# Patient Record
Sex: FEMALE | Race: BLACK OR AFRICAN AMERICAN | NOT HISPANIC OR LATINO | Employment: FULL TIME
[De-identification: names, ages, dates, MRNs, and addresses within clinical notes are randomized per-mention and may not be internally consistent; named-entity substitution may affect disease eponyms.]

---

## 2018-07-10 ENCOUNTER — RECORDS - HEALTHEAST (OUTPATIENT)
Dept: ADMINISTRATIVE | Facility: OTHER | Age: 43
End: 2018-07-10

## 2018-10-09 ENCOUNTER — OFFICE VISIT - HEALTHEAST (OUTPATIENT)
Dept: FAMILY MEDICINE | Facility: CLINIC | Age: 43
End: 2018-10-09

## 2018-10-09 ENCOUNTER — COMMUNICATION - HEALTHEAST (OUTPATIENT)
Dept: TELEHEALTH | Facility: CLINIC | Age: 43
End: 2018-10-09

## 2018-10-09 DIAGNOSIS — Z00.00 PREVENTATIVE HEALTH CARE: ICD-10-CM

## 2018-10-09 DIAGNOSIS — H93.13 TINNITUS, BILATERAL: ICD-10-CM

## 2018-10-09 DIAGNOSIS — Z23 IMMUNIZATION DUE: ICD-10-CM

## 2018-10-09 DIAGNOSIS — R23.8 PIMPLES: ICD-10-CM

## 2018-10-09 DIAGNOSIS — R93.89 ABNORMAL CT SCAN, CHEST: ICD-10-CM

## 2018-10-09 DIAGNOSIS — Z30.09 ENCOUNTER FOR COUNSELING REGARDING CONTRACEPTION: ICD-10-CM

## 2018-10-09 DIAGNOSIS — D25.9 UTERINE LEIOMYOMA, UNSPECIFIED LOCATION: ICD-10-CM

## 2018-10-09 ASSESSMENT — MIFFLIN-ST. JEOR: SCORE: 1505.24

## 2018-10-09 NOTE — ASSESSMENT & PLAN NOTE
Obtain release of information for images from 7/10/2018 ct pe run - in anticipation of radiologist needing this to compare the mass for stability.     Order non contrast chest ct today for follow up.    Follow up based on ct results. If stable, no need for further follow up.

## 2018-10-09 NOTE — ASSESSMENT & PLAN NOTE
Used depo in the past. Offered again today, but she says that she does not want to do that due to breast feeding. She forgets the pill, she uses condoms for now.

## 2018-10-19 ENCOUNTER — HOSPITAL ENCOUNTER (OUTPATIENT)
Dept: CT IMAGING | Facility: CLINIC | Age: 43
Discharge: HOME | End: 2018-10-19
Attending: FAMILY MEDICINE

## 2018-10-19 DIAGNOSIS — R93.89 ABNORMAL CT SCAN, CHEST: ICD-10-CM

## 2018-10-22 ENCOUNTER — COMMUNICATION - HEALTHEAST (OUTPATIENT)
Dept: FAMILY MEDICINE | Facility: CLINIC | Age: 43
End: 2018-10-22

## 2018-10-25 ENCOUNTER — RECORDS - HEALTHEAST (OUTPATIENT)
Dept: ADMINISTRATIVE | Facility: OTHER | Age: 43
End: 2018-10-25

## 2018-10-25 LAB — PAP SMEAR - HIM PATIENT REPORTED: NORMAL

## 2018-12-04 ENCOUNTER — OFFICE VISIT - HEALTHEAST (OUTPATIENT)
Dept: AUDIOLOGY | Facility: CLINIC | Age: 43
End: 2018-12-04

## 2018-12-04 ENCOUNTER — OFFICE VISIT - HEALTHEAST (OUTPATIENT)
Dept: OTOLARYNGOLOGY | Facility: CLINIC | Age: 43
End: 2018-12-04

## 2018-12-04 DIAGNOSIS — H93.13 TINNITUS, BILATERAL: ICD-10-CM

## 2018-12-04 DIAGNOSIS — R42 DIZZINESS AND GIDDINESS: ICD-10-CM

## 2018-12-04 DIAGNOSIS — Z01.10 EXAMINATION OF EARS AND HEARING: ICD-10-CM

## 2019-01-02 ENCOUNTER — COMMUNICATION - HEALTHEAST (OUTPATIENT)
Dept: FAMILY MEDICINE | Facility: CLINIC | Age: 44
End: 2019-01-02

## 2019-04-02 ENCOUNTER — OFFICE VISIT - HEALTHEAST (OUTPATIENT)
Dept: FAMILY MEDICINE | Facility: CLINIC | Age: 44
End: 2019-04-02

## 2019-04-02 DIAGNOSIS — R20.0 LEFT ARM NUMBNESS: ICD-10-CM

## 2019-04-02 DIAGNOSIS — Z00.00 PREVENTATIVE HEALTH CARE: ICD-10-CM

## 2019-04-02 DIAGNOSIS — R51.9 LEFT-SIDED HEADACHE: ICD-10-CM

## 2019-04-02 DIAGNOSIS — H93.13 TINNITUS, BILATERAL: ICD-10-CM

## 2019-04-02 ASSESSMENT — MIFFLIN-ST. JEOR: SCORE: 1509.77

## 2019-04-02 NOTE — ASSESSMENT & PLAN NOTE
Intermittent left-sided headache that has come and gone over the last month about 5 times.  She decided to come in because at times the pain is so severe that she would rated 10 out of 10.  Tylenol will decrease the pain to the point where she can tolerate it but it does not go away.  The pain will last for about a half a day at a time.  Once she did note 15 seconds of numbness going into her left arm that seemed to be related to the severe pain.  It spontaneously resolved so she did not seek medical attention-that happened last week.  Today she wanted to come in and be seen.  She is currently not having the headache.she is currently not having the headache.     Because of pain out of proportion to exam, and because she had numbness in her arm (although it was short lived and transient), and she has pain adjacent to the left c spine at c3 today I ordered both mri cspine and neurology consult.     Follow up in one month or sooner if needed.

## 2019-04-24 ENCOUNTER — HOSPITAL ENCOUNTER (OUTPATIENT)
Dept: MRI IMAGING | Facility: CLINIC | Age: 44
Discharge: HOME | End: 2019-04-24
Attending: FAMILY MEDICINE

## 2019-04-24 DIAGNOSIS — R20.0 LEFT ARM NUMBNESS: ICD-10-CM

## 2019-04-24 DIAGNOSIS — R51.9 LEFT-SIDED HEADACHE: ICD-10-CM

## 2019-05-07 ENCOUNTER — RECORDS - HEALTHEAST (OUTPATIENT)
Dept: ADMINISTRATIVE | Facility: OTHER | Age: 44
End: 2019-05-07

## 2019-05-13 ENCOUNTER — RECORDS - HEALTHEAST (OUTPATIENT)
Dept: LAB | Facility: CLINIC | Age: 44
End: 2019-05-13

## 2019-05-13 LAB
C REACTIVE PROTEIN LHE: 0.1 MG/DL (ref 0–0.8)
ERYTHROCYTE [SEDIMENTATION RATE] IN BLOOD BY WESTERGREN METHOD: 2 MM/HR (ref 0–20)

## 2019-05-14 LAB
CARDIOLIPIN IGG SER IA-ACNC: <1.6 GPL-U/ML (ref 0–19.9)
CARDIOLIPIN IGM SER IA-ACNC: 1.9 MPL-U/ML (ref 0–19.9)

## 2019-05-18 ENCOUNTER — HOSPITAL ENCOUNTER (OUTPATIENT)
Dept: MRI IMAGING | Facility: CLINIC | Age: 44
Discharge: HOME | End: 2019-05-18
Attending: PSYCHIATRY & NEUROLOGY

## 2019-05-18 DIAGNOSIS — G44.53 THUNDERCLAP HEADACHE: ICD-10-CM

## 2019-11-08 ENCOUNTER — RECORDS - HEALTHEAST (OUTPATIENT)
Dept: HEALTH INFORMATION MANAGEMENT | Facility: CLINIC | Age: 44
End: 2019-11-08

## 2020-11-10 ENCOUNTER — OFFICE VISIT - HEALTHEAST (OUTPATIENT)
Dept: FAMILY MEDICINE | Facility: CLINIC | Age: 45
End: 2020-11-10

## 2020-11-10 DIAGNOSIS — Z11.4 SCREENING FOR HIV (HUMAN IMMUNODEFICIENCY VIRUS): ICD-10-CM

## 2020-11-10 DIAGNOSIS — Z12.31 ENCOUNTER FOR SCREENING MAMMOGRAM FOR MALIGNANT NEOPLASM OF BREAST: ICD-10-CM

## 2020-11-10 DIAGNOSIS — Z12.4 SCREENING FOR MALIGNANT NEOPLASM OF CERVIX: ICD-10-CM

## 2020-11-10 DIAGNOSIS — R42 DIZZINESS: ICD-10-CM

## 2020-11-10 DIAGNOSIS — Z13.220 SCREENING, LIPID: ICD-10-CM

## 2020-11-10 DIAGNOSIS — Z11.59 ENCOUNTER FOR HEPATITIS C SCREENING TEST FOR LOW RISK PATIENT: ICD-10-CM

## 2020-11-10 DIAGNOSIS — E66.811 CLASS 1 OBESITY DUE TO EXCESS CALORIES WITHOUT SERIOUS COMORBIDITY WITH BODY MASS INDEX (BMI) OF 32.0 TO 32.9 IN ADULT: ICD-10-CM

## 2020-11-10 DIAGNOSIS — Z30.09 ENCOUNTER FOR COUNSELING REGARDING CONTRACEPTION: ICD-10-CM

## 2020-11-10 DIAGNOSIS — Z13.228 SCREENING FOR METABOLIC DISORDER: ICD-10-CM

## 2020-11-10 DIAGNOSIS — R93.89 ABNORMAL CT SCAN, CHEST: ICD-10-CM

## 2020-11-10 DIAGNOSIS — Z00.00 PREVENTATIVE HEALTH CARE: ICD-10-CM

## 2020-11-10 DIAGNOSIS — Z13.0 SCREENING, ANEMIA, DEFICIENCY, IRON: ICD-10-CM

## 2020-11-10 DIAGNOSIS — R68.84 JAW PAIN: ICD-10-CM

## 2020-11-10 DIAGNOSIS — E66.09 CLASS 1 OBESITY DUE TO EXCESS CALORIES WITHOUT SERIOUS COMORBIDITY WITH BODY MASS INDEX (BMI) OF 32.0 TO 32.9 IN ADULT: ICD-10-CM

## 2020-11-10 LAB
ALBUMIN SERPL-MCNC: 4.1 G/DL (ref 3.5–5)
ALP SERPL-CCNC: 52 U/L (ref 45–120)
ALT SERPL W P-5'-P-CCNC: 56 U/L (ref 0–45)
ANION GAP SERPL CALCULATED.3IONS-SCNC: 10 MMOL/L (ref 5–18)
AST SERPL W P-5'-P-CCNC: 28 U/L (ref 0–40)
BASOPHILS # BLD AUTO: 0.1 THOU/UL (ref 0–0.2)
BASOPHILS NFR BLD AUTO: 1 % (ref 0–2)
BILIRUB SERPL-MCNC: 0.3 MG/DL (ref 0–1)
BUN SERPL-MCNC: 10 MG/DL (ref 8–22)
CALCIUM SERPL-MCNC: 10 MG/DL (ref 8.5–10.5)
CHLORIDE BLD-SCNC: 106 MMOL/L (ref 98–107)
CO2 SERPL-SCNC: 23 MMOL/L (ref 22–31)
CREAT SERPL-MCNC: 0.68 MG/DL (ref 0.6–1.1)
EOSINOPHIL # BLD AUTO: 0.1 THOU/UL (ref 0–0.4)
EOSINOPHIL NFR BLD AUTO: 1 % (ref 0–6)
ERYTHROCYTE [DISTWIDTH] IN BLOOD BY AUTOMATED COUNT: 11 % (ref 11–14.5)
GFR SERPL CREATININE-BSD FRML MDRD: >60 ML/MIN/1.73M2
GLUCOSE BLD-MCNC: 80 MG/DL (ref 70–125)
HCT VFR BLD AUTO: 45.4 % (ref 35–47)
HGB BLD-MCNC: 15.5 G/DL (ref 12–16)
HIV 1+2 AB+HIV1 P24 AG SERPL QL IA: NEGATIVE
LYMPHOCYTES # BLD AUTO: 2.5 THOU/UL (ref 0.8–4.4)
LYMPHOCYTES NFR BLD AUTO: 31 % (ref 20–40)
MCH RBC QN AUTO: 31.8 PG (ref 27–34)
MCHC RBC AUTO-ENTMCNC: 34.2 G/DL (ref 32–36)
MCV RBC AUTO: 93 FL (ref 80–100)
MONOCYTES # BLD AUTO: 0.7 THOU/UL (ref 0–0.9)
MONOCYTES NFR BLD AUTO: 8 % (ref 2–10)
NEUTROPHILS # BLD AUTO: 4.7 THOU/UL (ref 2–7.7)
NEUTROPHILS NFR BLD AUTO: 59 % (ref 50–70)
PLATELET # BLD AUTO: 333 THOU/UL (ref 140–440)
PMV BLD AUTO: 7.9 FL (ref 7–10)
POTASSIUM BLD-SCNC: 3.8 MMOL/L (ref 3.5–5)
PROT SERPL-MCNC: 7.3 G/DL (ref 6–8)
RBC # BLD AUTO: 4.88 MILL/UL (ref 3.8–5.4)
SODIUM SERPL-SCNC: 139 MMOL/L (ref 136–145)
TSH SERPL DL<=0.005 MIU/L-ACNC: 1.49 UIU/ML (ref 0.3–5)
WBC: 7.9 THOU/UL (ref 4–11)

## 2020-11-10 ASSESSMENT — MIFFLIN-ST. JEOR: SCORE: 1595.96

## 2020-11-10 NOTE — ASSESSMENT & PLAN NOTE
Follow up ct was normal. It does not appear a comparison was ever made, will try and contact radiology to confirm if they were able tocompare. Message attached to this visit sent to staff to research.

## 2020-11-10 NOTE — ASSESSMENT & PLAN NOTE
Flu shot declined but plans to do. Got depo provera shot today and arm sore.   Pap: reviewed chart, pap unsatisfactory for eval 10/2018, will repeat today.   Mammo: recommended.   Colonoscopy:  There is no family or personal history, not indicated    Std testing desired: declined offered  Osteoporosis prevention discussed.  vitamin d levels ordered. Recommend daily calcium and vitamin d intake to keep good bone health. Recommend weight bearing exercise, no tobacco, and limit alcohol  dexa - no indication (age)  Recommend sunscreen, exercise, & healthy diet.  Offered cbc, cmp,lipids and asked what other testing she  desires today  I have had an Advance Directives discussion with the patient.   Body mass index is 32.12 kg/m .   mychart offered.

## 2020-11-10 NOTE — ASSESSMENT & PLAN NOTE
Weight reduction recommended.  She  is offered participating in the Weight Loss Program at [facility].

## 2020-11-10 NOTE — ASSESSMENT & PLAN NOTE
She says she has left lower jaw pain from which her headache stems, she did see neurologist and all work up was negative, I have recommended she see dentist.     5/7/19 neuro note reviewed (nasp)   MRI C-spine noted 4/24/2019    Today complains of dizziness, weakness, headache that seems related to left jaw pain which hurts when she touches her left lower jaw and pins and needles sensation.     Labs today and plan to see dentist and follow up in 2 weeks to review all this and plan.

## 2020-11-11 LAB — HCV AB SERPL QL IA: NEGATIVE

## 2020-11-17 ENCOUNTER — AMBULATORY - HEALTHEAST (OUTPATIENT)
Dept: LAB | Facility: CLINIC | Age: 45
End: 2020-11-17

## 2020-11-17 ENCOUNTER — COMMUNICATION - HEALTHEAST (OUTPATIENT)
Dept: FAMILY MEDICINE | Facility: CLINIC | Age: 45
End: 2020-11-17

## 2020-11-17 DIAGNOSIS — Z13.220 SCREENING, LIPID: ICD-10-CM

## 2020-11-17 LAB
CHOLEST SERPL-MCNC: 163 MG/DL
FASTING STATUS PATIENT QL REPORTED: YES
HDLC SERPL-MCNC: 38 MG/DL
LDLC SERPL CALC-MCNC: 110 MG/DL
TRIGL SERPL-MCNC: 76 MG/DL

## 2020-12-04 ENCOUNTER — HOSPITAL ENCOUNTER (OUTPATIENT)
Dept: MAMMOGRAPHY | Facility: CLINIC | Age: 45
Discharge: HOME | End: 2020-12-04
Attending: FAMILY MEDICINE

## 2020-12-04 DIAGNOSIS — Z12.31 ENCOUNTER FOR SCREENING MAMMOGRAM FOR MALIGNANT NEOPLASM OF BREAST: ICD-10-CM

## 2021-05-27 NOTE — PROGRESS NOTES
ASSESSMENT AND PLAN:     Problem List Items Addressed This Visit        Unprioritized    Preventative health care     Due for annual exam.          Left-sided headache     Intermittent left-sided headache that has come and gone over the last month about 5 times.  She decided to come in because at times the pain is so severe that she would rated 10 out of 10.  Tylenol will decrease the pain to the point where she can tolerate it but it does not go away.  The pain will last for about a half a day at a time.  Once she did note 15 seconds of numbness going into her left arm that seemed to be related to the severe pain.  It spontaneously resolved so she did not seek medical attention-that happened last week.  Today she wanted to come in and be seen.  She is currently not having the headache.she is currently not having the headache.     Because of pain out of proportion to exam, and because she had numbness in her arm (although it was short lived and transient), and she has pain adjacent to the left c spine at c3 today I ordered both mri cspine and neurology consult.     Follow up in one month or sooner if needed.          Relevant Orders    Ambulatory referral to Neurology    MR Cervical Spine With Without Contrast    RESOLVED: Tinnitus, bilateral     Resolved per patient.            Other Visit Diagnoses     Left arm numbness    -  Primary    Relevant Orders    Ambulatory referral to Neurology    MR Cervical Spine With Without Contrast           Chief Complaint   Patient presents with     Headache     Patient has been dealing with a left-sided headache for the last 2 months. States it comes and goes, but gets very severe at times.         JONATHAN Gavin is a 43 y.o. female was new to me 10/2018 when she had moved from [place].  At that time we dealt with a uterine fibroid, and axillary and some genital lesions.  Today, she comes in complaining of new problem of headache    Headache, left side, sore in her  scalp and inside of head also hurts.  It hurts to lay on her left side when she is in bed.  Also hurts to move her head.  She points to the left neck and back area.  It seems to involve her arm on occasion - described one episode lasting 15 min of numbness.      No problem with vision, she just got new glasses    She is right hand dominant.  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 She has had this headache 5 times, it seems to come on in the evenings, and 700 mg of tylenol seems to help so she can sleep, but when she wakes up it is still there.  She notes when she lays on the left side her scalp can even hurt (back of left head).  It is pretty light headache the next morning. She says the pain is 10/ 10 at its worst and she notes it has only been that bad twice and it lasted at that intensity for two hours, tylenol helped it get better but it was not gone.     She also reminds me that she did have a problem with tinnitus, but that is gone since November 2018.     pain out of proportion to exam, once left arm went numb for 15 seconds - not problematic now.        ct of axilla lump looked normal, but we were awaiting outside ct to compare. did that ever happen?  She says that she can track this down.         Social History     Tobacco Use   Smoking Status Never Smoker   Smokeless Tobacco Never Used      No current outpatient medications on file prior to visit.     No current facility-administered medications on file prior to visit.       No Known Allergies    Review of Systems   Constitutional: Negative.    HENT: Negative.    Eyes: Negative.    Respiratory: Negative.    Cardiovascular: Negative.    Gastrointestinal: Negative.    Endocrine: Negative.    Genitourinary: Negative.    Musculoskeletal: Negative.    Skin: Negative.    Neurological: Negative.    Hematological: Negative.   "  Psychiatric/Behavioral: Negative.         OBJECTIVE: /66   Pulse 68   Resp 12   Ht 5' 6.5\" (1.689 m)   Wt 183 lb (83 kg)   LMP 03/03/2019 (Approximate)   Breastfeeding? No   BMI 29.09 kg/m     Physical Exam   Constitutional: She is oriented to person, place, and time. She appears well-developed and well-nourished. No distress.   HENT:   Head: Normocephalic and atraumatic.   Right Ear: External ear normal.   Left Ear: External ear normal.   Nose: Nose normal.   Mouth/Throat: Oropharynx is clear and moist.   Of note she is wearing a wig and she does have a little scalp tenderness just above the left ear where the wig touches the scalp, but she says the wig is new and could not be causing this problem.   Eyes: Conjunctivae are normal.   Neck: Normal range of motion. Neck supple.   Some neck pain that may stem from he cspine with palpation of the tissue adjacent to the c spine at about the c3 level.  Otherwise she is not currently having the headache.    Cardiovascular: Normal rate, regular rhythm and normal heart sounds.   Pulmonary/Chest: Effort normal and breath sounds normal.   Musculoskeletal: Normal range of motion.   Neurological: She is alert and oriented to person, place, and time.   Skin: Skin is warm and dry.   Psychiatric: She has a normal mood and affect.        This note was created using Dragon dictation.  Please excuse any grammatical errors.   "

## 2021-06-02 VITALS — WEIGHT: 183 LBS | BODY MASS INDEX: 28.72 KG/M2 | HEIGHT: 67 IN

## 2021-06-02 VITALS — WEIGHT: 182 LBS | HEIGHT: 67 IN | BODY MASS INDEX: 28.56 KG/M2

## 2021-06-05 VITALS
HEART RATE: 58 BPM | OXYGEN SATURATION: 97 % | DIASTOLIC BLOOD PRESSURE: 62 MMHG | WEIGHT: 202 LBS | HEIGHT: 67 IN | SYSTOLIC BLOOD PRESSURE: 100 MMHG | BODY MASS INDEX: 31.71 KG/M2

## 2021-06-13 NOTE — TELEPHONE ENCOUNTER
----- Message from Darcy Yoon MD sent at 11/10/2020  3:28 PM CST -----  Ct scan done in past was abnormal. Radiology was waiting on old scan to compare, were they ever able to do this?

## 2021-06-13 NOTE — TELEPHONE ENCOUNTER
Still awaiting ct of chest from [place] I have left a message with patient to call and get ct scan sent up to us so we can do a comparison

## 2021-06-13 NOTE — PROGRESS NOTES
FEMALE PREVENTATIVE EXAM  Jaw pain addressed above and beyond   Today complains of dizziness, weakness, headache that seems related to left jaw pain which hurts when she touches her left lower jaw and pins and needles sensation.     Assessment and Plan:   Patient has been advised of split billing requirements and indicates understanding: Yes    Problem List Items Addressed This Visit        Unprioritized    Abnormal CT scan, chest     Follow up ct was normal. It does not appear a comparison was ever made, will try and contact radiology to confirm if they were able to compare. Message attached to this visit sent to staff to research.          Encounter for counseling regarding contraception     Currently using depo provera.          Preventative health care     Flu shot declined but plans to do. Got depo provera shot today and arm sore.   Pap: reviewed chart, pap unsatisfactory for eval 10/2018, will repeat today.   Mammo: recommended.   Colonoscopy:  There is no family or personal history, not indicated    Std testing desired: declined offered  Osteoporosis prevention discussed.  vitamin d levels ordered. Recommend daily calcium and vitamin d intake to keep good bone health. Recommend weight bearing exercise, no tobacco, and limit alcohol  dexa - no indication (age)  Recommend sunscreen, exercise, & healthy diet.  Offered cbc, cmp, lipids and asked what other testing she  desires today  I have had an Advance Directives discussion with the patient.   Body mass index is 32.12 kg/m .   mychart offered.           Jaw pain     She says she has left lower jaw pain from which her headache stems, she did see neurologist and all work up was negative, I have recommended she see dentist.     5/7/19 neuro note reviewed (nasp)   MRI C-spine noted 4/24/2019    Today complains of dizziness, weakness, headache that seems related to left jaw pain which hurts when she touches her left lower jaw and pins and needles sensation.      Labs today and plan to see dentist and follow up in 2 weeks to review all this and plan.          Relevant Orders    Ambulatory referral to Dentistry    Class 1 obesity without serious comorbidity with body mass index (BMI) of 32.0 to 32.9 in adult     Weight reduction recommended.  She  is offered participating in the Weight Loss Program at [facility].            Other Visit Diagnoses     Screening for metabolic disorder    -  Primary    Relevant Orders    Comprehensive Metabolic Panel    Screening, anemia, deficiency, iron        Relevant Orders    HM1(CBC and Differential)    Screening, lipid        Relevant Orders    Lipid [place] FASTING    Encounter for hepatitis C screening test for low risk patient        Relevant Orders    Hepatitis C Antibody (Anti-HCV)    Screening for HIV (human immunodeficiency virus)        Relevant Orders    HIV Antigen/Antibody Screening [place]    Screening for malignant neoplasm of cervix        Relevant Orders    Gynecologic Cytology (PAP Smear)    Dizziness        Relevant Orders    Comprehensive Metabolic Panel    Thyroid [facility]    Encounter for screening mammogram for malignant neoplasm of breast        Relevant Orders    Mammo Screening Bilateral           Next follow up:  Return in about 2 weeks (around 11/24/2020) for Next scheduled follow up.    Immunization Review  Adult Imm Review: Missing doses of flu shot, wants to get at follow up because she got depo provera today and arm sore.  BMI: 32  Social History     Tobacco Use   Smoking Status Never Smoker   Smokeless Tobacco Never Used      I discussed the following with the patient:   Adult Healthy Living: Importance of regular exercise  Healthy nutrition  Weight loss referral options    I have had an Advance Directives discussion with the patient.    Subjective:   Chief Complaint: Lindsey Gavin is an 45 y.o. female here for a preventative health visit.  Complains of dizziness, weakness, headache that  "seems related to left jaw pain which hurts when she touches her left lower jaw and pins and needles sensation.     Patient has been advised of split billing requirements and indicates understanding: Yes  HPI:  Comes in today for annual exam and complains of left side lower jaw pain and headache on that side. She has not seen a dentist.     Healthy Habits  Are you taking a daily aspirin? No  Do you typically exercising at least 40 min, 3-4 times per week?  NO  Do you usually eat at least 4 servings of fruit and vegetables a day, include whole grains and fiber and avoid regularly eating high fat foods? Yes  Have you had an eye exam in the past two years? Yes  Do you see a dentist twice per year? NO  Do you have any concerns regarding sleep? No    Safety Screen  If you own firearms, are they secured in a locked gun cabinet or with trigger locks? NO  Do you feel you are safe where you are living?: Yes (11/10/2020  2:46 PM)  Do you feel you are safe in your relationship(s)?: Yes (11/10/2020  2:46 PM)    Review of Systems:  Please see above.  The rest of the review of systems are negative for all systems.     Pap History:   Yes - updated in Problem List and Health Maintenance accordingly  Cancer Screening       Status Date      PAP SMEAR Next Due 10/25/2021      Done 10/25/2018 PAP SMEAR EXTERNAL RESULT     Patient has more history with this topic...          Patient Care Team:  Darcy Yoon MD as PCP - General (Family Medicine)  Darcy Yoon MD as Assigned PCP        History     Reviewed By Date/Time Sections Reviewed    Darcy Yoon MD 11/10/2020  3:28 PM Social Documentation    Darcy Yoon MD 11/10/2020  3:27 PM Medical, Surgical, Family    Staci Henson CMA 11/10/2020  2:46 PM Tobacco            Objective:   Vital Signs:   Visit Vitals  /62 (Patient Site: Left Arm, Patient Position: Sitting, Cuff Size: Adult Large)   Pulse (!) 58   Ht 5' 6.5\" (1.689 m)   Wt 202 lb (91.6 kg)   LMP " 10/05/2020 (Approximate)   SpO2 97%   BMI 32.12 kg/m           PHYSICAL EXAM  Physical Exam  Constitutional:       General: She is not in acute distress.     Appearance: She is well-developed.   HENT:      Head: Normocephalic and atraumatic.   Eyes:      Conjunctiva/sclera: Conjunctivae normal.   Neck:      Musculoskeletal: Neck supple.   Cardiovascular:      Rate and Rhythm: Normal rate and regular rhythm.   Pulmonary:      Effort: Pulmonary effort is normal.   Chest:      Breasts: Breasts are symmetrical.         Right: Normal. No swelling, bleeding, inverted nipple, mass, nipple discharge, skin change or tenderness.         Left: Normal. No swelling, bleeding, inverted nipple, mass, nipple discharge, skin change or tenderness.   Genitourinary:     Exam position: Lithotomy position.      Vagina: Normal.      Cervix: Normal.      Uterus: Normal.       Adnexa: Right adnexa normal and left adnexa normal.      Rectum: Normal.   Musculoskeletal: Normal range of motion.   Skin:     General: Skin is warm and dry.   Neurological:      Mental Status: She is alert and oriented to person, place, and time. Mental status is at baseline.      Cranial Nerves: Cranial nerves are intact.      Sensory: Sensation is intact.      Motor: Motor function is intact.      Coordination: Coordination is intact.      Comments: Normal gait, able to mount exam table. Normal eye contact, conjugate gaze.           The ASCVD Risk score (Oliverio JC Jr., et al., 2013) failed to calculate for the following reasons:    Cannot find a previous HDL lab    Cannot find a previous total cholesterol lab         Medication List      as of November 10, 2020  3:50 PM     You have not been prescribed any medications.         Additional Screenings Completed Today:

## 2021-06-16 PROBLEM — D25.9 UTERINE FIBROID: Status: ACTIVE | Noted: 2018-10-09

## 2021-06-16 PROBLEM — Z30.09 ENCOUNTER FOR COUNSELING REGARDING CONTRACEPTION: Status: ACTIVE | Noted: 2018-10-09

## 2021-06-16 PROBLEM — R68.84 JAW PAIN: Status: ACTIVE | Noted: 2019-04-02

## 2021-06-16 PROBLEM — E66.811 CLASS 1 OBESITY WITHOUT SERIOUS COMORBIDITY WITH BODY MASS INDEX (BMI) OF 32.0 TO 32.9 IN ADULT: Status: ACTIVE | Noted: 2020-11-10

## 2021-06-16 PROBLEM — R93.89 ABNORMAL CT SCAN, CHEST: Status: ACTIVE | Noted: 2018-10-09

## 2021-06-16 PROBLEM — R23.8 PIMPLES: Status: ACTIVE | Noted: 2018-10-09

## 2021-06-16 PROBLEM — Z00.00 PREVENTATIVE HEALTH CARE: Status: ACTIVE | Noted: 2018-10-09

## 2021-06-20 NOTE — PROGRESS NOTES
ASSESSMENT AND PLAN:     Problem List Items Addressed This Visit        Unprioritized    Abnormal CT scan, chest     Obtain release of information for images from 7/10/2018 ct pe run - in anticipation of radiologist needing this to compare the mass for stability.     Order non contrast chest ct today for follow up.    Follow up based on ct results. If stable, no need for further follow up.          Relevant Orders    CT Chest Without Contrast    Uterine fibroid     Gynecologist consult.          Relevant Orders    Ambulatory referral to Gynecology    Encounter for counseling regarding contraception     Used depo in the past. Offered again today, but she says that she does not want to do that due to breast feeding. She forgets the pill, she uses condoms for now.          Pimples     Noted in groin, warm pack or warm tub soaks recommended. Watch and wait return if not resolving.          Tinnitus, bilateral     Noted since July about twice a month for 15 min at a time and self resolves. Recommend audiology consult. Follow up prn.          Relevant Orders    Ambulatory referral to Audiology    Preventative health care     Recommend annual exam.            Other Visit Diagnoses     Immunization due    -  Primary    Relevant Orders    Influenza, Seasonal,Quad Inj, 36+ MOS           Chief Complaint   Patient presents with     [facility] [place] Care     Patient states she needs a repeat CT scan. Brought old records with her.      Hearing Problem     States that at times, she can't hear due to noises in her ears. Lasts about 10 minutes at a time.         JONATHAN  Lindsey Gavin is a 43 y.o. female is new to me today and is new to [place].  She recently moved here from [place].  She has an abnormality on her CT scan that needs follow-up.  She also has noticed some ringing in her ears on and off for the past few months.  She has a current comes for about 15 minutes at a time and goes away on its own but she is noticed it in  both ears and she is wondering what that could be.  She also wants to consult with a gynecologist because she has a uterine fibroid.  In the past she has used Depo-Provera for contraception but is not sure she wants to use that again.  She wants to discuss with the gynecologist what is the best especially given her fibroid.  She would like a referral to gynecology.  She also needs a postpartum check because she had a baby in June and never really had a postpartum check.  She has some bumps in her groin area which she would like me to take a look at today as well.      History   Smoking Status     Never Smoker   Smokeless Tobacco     Never Used      No current outpatient prescriptions on file prior to visit.     No current facility-administered medications on file prior to visit.       No Known Allergies   Family History   Problem Relation Age of Onset     Stroke Mother      Hypertension Mother      Other Father      influenza like illness     No Medical Problems Sister      No Medical Problems Brother      No Medical Problems Sister      No Medical Problems Sister      No Medical Problems Sister      No Medical Problems Sister      No Medical Problems Sister      No Medical Problems Brother      Breast cancer Neg Hx      Skin cancer Neg Hx      Colon cancer Neg Hx       Social History     Social History     Marital status:      Spouse name: N/A     Number of children: N/A     Years of education: N/A     Occupational History     lpn      Social History Main Topics     Smoking status: Never Smoker     Smokeless tobacco: Never Used     Alcohol use No     Drug use: No     Sexual activity: Yes     Partners: Male     Birth control/ protection: Condom     Other Topics Concern     None     Social History Narrative    10/9/2018 - moved to [place] from [place]      Review of Systems   Constitutional: Negative.    HENT: Negative.    Eyes: Negative.    Respiratory: Negative.    Cardiovascular: Negative.    Gastrointestinal:  "Negative.    Endocrine: Negative.    Genitourinary: Negative.    Musculoskeletal: Negative.    Skin: Negative.    Neurological: Negative.    Hematological: Negative.    Psychiatric/Behavioral: Negative.         OBJECTIVE: /70  Pulse 60  Resp 12  Ht 5' 6.5\" (1.689 m)  Wt 182 lb (82.6 kg)  LMP 09/20/2018 (Exact Date)  Breastfeeding? Yes  BMI 28.94 kg/m2   Physical Exam   Constitutional: She is oriented to person, place, and time. She appears well-developed and well-nourished. No distress.   HENT:   Head: Normocephalic and atraumatic.   Right Ear: External ear normal.   Left Ear: External ear normal.   Eyes: Conjunctivae are normal.   Neck: Neck supple.   Cardiovascular: Normal rate and regular rhythm.    Pulmonary/Chest: Effort normal.   Abdominal: Soft.   Musculoskeletal: Normal range of motion.   Neurological: She is alert and oriented to person, place, and time.   Skin: Skin is warm and dry.   There are 2 small bumps noted in the skin of the proximal left inner thigh.  Both appear to be pimples.  Neither appear to be malignant.   Psychiatric: She has a normal mood and affect.     Orders Placed This Encounter   Procedures     CT Chest Without Contrast     Influenza, Seasonal,Quad Inj, 36+ MOS     Ambulatory referral to Gynecology     Ambulatory referral to Audiology        This note was created using Dragon dictation.  Please excuse any grammatical errors.    "

## 2021-06-22 NOTE — TELEPHONE ENCOUNTER
Please call patient and let her know that I have been waiting [place] CT [place] compared to her CT scan done 2 months ago.  I still have not received the comparison CT scan.  If she can obtain this we are happy to look at it -but for now we do not have it.

## 2021-06-22 NOTE — TELEPHONE ENCOUNTER
There is an External CT Scan dated 7/10/18 in the MEDIA TAB for this pt. ... is this the one you are looking for?

## 2021-06-22 NOTE — PROGRESS NOTES
Lindsey Gavin is a 43 y.o. female seen in consultation at the request of Dr. Yoon for tinnitus.  Patient has no noticed hearing loss .  Denies otologic history of infections or surgeries. Denies vertigo.  She notes her hearing was initially quite loud and frequent and has slowly improved in frequency.  She last heard the tinnitus about a month ago.      ALLERGY:  No Known Allergies    MEDICATIONS:     No current outpatient medications on file prior to visit.     No current facility-administered medications on file prior to visit.        Past Medical/Surgical History, Family History and Social History reviewed in detail and documented separately in the medical record.    Complete Review of Systems:  A 10-point review was performed.  Pertinent positives are noted in the HPI and on a separate scanned document in the chart.    EXAM:  There were no vitals filed for this visit.    Nurse documentation reviewed  and documented separately.    General Appearance: Pleasant, alert, appropriate appearance for age. No acute distress    Head Exam: Normal. Normocephalic, atraumatic.    Eye Exam: Normal external eye, conjunctiva, lids, cornea. Extra-ocular movements are intact.    Left external ear: normal  Left otoscopic exam: Normal EAC. Normal TM     Right external ear: normal  Right otoscopic exam: Normal EAC. Normal TM    Nose Exam: Normal external nose. Septum midline. Nasal mucosa normal.  Inferior turbinates normal.    OroPharynx Exam: Dental hygiene adequate. Normal tongue. Normal buccal mucosa. Normal palate.  Normal pharynx. Normal tonsils.    Neck Exam: Supple, no masses or nodes. Trachea and larynx midline.    Thyroid Exam: No tenderness, nodules or enlargement.    Salivary Glands: nontender without masses    Neuro: Alert and oriented times 3, CN 2-12 grossly intact, no nystagmus, PERRL, EOMI, normal speech and gait    Chest/Respiratory Exam: Normal chest wall motion and respiratory effort. No audible  stridor or wheezing.    Cardiovascular Exam: Regular rate and rhythm.  No cyanosis, clubbing or edema.    Pulses: carotid pulses normal    ASSESSMENT:  1. Tinnitus, bilateral        PLAN: Findings, assessment, and management options were discussed.  Discussed pathophysiology, masking techniques and triggers for tinnitus.  We discussed current guidelines in regards to approach to tinnitus.  Fortunately she has been without the sound for a few weeks.

## 2021-06-22 NOTE — TELEPHONE ENCOUNTER
Radiologist reports original images to compare.  So I was looking for images-not a report.  Could you please let the patient know if we do not have this so she can request them again?

## 2021-06-22 NOTE — TELEPHONE ENCOUNTER
Left message to call back for: pt  Information to relay to patient:  See below message from Dr. Yoon and relay

## 2021-06-22 NOTE — TELEPHONE ENCOUNTER
I will close this encounter for now and this message can still be relayed when the patient calls back.

## 2021-06-22 NOTE — PROGRESS NOTES
Audiology Report    Referring Provider:   Service    History:  Lindsey Gavin is seen in conjunction with ENT appointment today. She has a history of intermittent tinnitus bilaterally which started in July 2018. She also reports concerns with dizziness and headaches. She reports that English is her second language, but she also struggles to understand people speaking in her native language. She denies a history of otorrhea, loud noise exposure, or ear surgery.      Results:     Left Ear Right Ear   Otoscopy clear canals clear canals   Pure Tone Audiometry normal hearing   normal hearing   Word Recognition excellent excellent   Tympanometry normal (Type A)  normal (Type A)     Transducer: Circumaural headphones    Reliability was good  and there was good  SRT to PTA agreement.       Plan:  The patient is returned to ENT for follow up.  She should return for retesting with ENT recommendation.     Please see audiogram under  media  and  audiogram  in the patient s chart.     Ivan Ely, CCC-A  [place] Licensed Audiologist #[id number]

## 2021-06-25 ENCOUNTER — OFFICE VISIT - HEALTHEAST (OUTPATIENT)
Dept: FAMILY MEDICINE | Facility: CLINIC | Age: 46
End: 2021-06-25

## 2021-06-25 DIAGNOSIS — T14.8XXA MUSCLE STRAIN: ICD-10-CM

## 2021-06-25 LAB
ALBUMIN UR-MCNC: NEGATIVE G/DL
APPEARANCE UR: CLEAR
BASOPHILS # BLD AUTO: 0 THOU/UL (ref 0–0.2)
BASOPHILS NFR BLD AUTO: 1 % (ref 0–2)
BILIRUB UR QL STRIP: NEGATIVE
COLOR UR AUTO: YELLOW
EOSINOPHIL # BLD AUTO: 0 THOU/UL (ref 0–0.4)
EOSINOPHIL NFR BLD AUTO: 1 % (ref 0–6)
ERYTHROCYTE [DISTWIDTH] IN BLOOD BY AUTOMATED COUNT: 12.6 % (ref 11–14.5)
GLUCOSE UR STRIP-MCNC: NEGATIVE MG/DL
HCT VFR BLD AUTO: 44.4 % (ref 35–47)
HGB BLD-MCNC: 14.7 G/DL (ref 12–16)
HGB UR QL STRIP: NEGATIVE
IMM GRANULOCYTES # BLD: 0 THOU/UL
IMM GRANULOCYTES NFR BLD: 0 %
KETONES UR STRIP-MCNC: NEGATIVE MG/DL
LEUKOCYTE ESTERASE UR QL STRIP: NEGATIVE
LYMPHOCYTES # BLD AUTO: 2.3 THOU/UL (ref 0.8–4.4)
LYMPHOCYTES NFR BLD AUTO: 39 % (ref 20–40)
MCH RBC QN AUTO: 30.2 PG (ref 27–34)
MCHC RBC AUTO-ENTMCNC: 33.1 G/DL (ref 32–36)
MCV RBC AUTO: 91 FL (ref 80–100)
MONOCYTES # BLD AUTO: 0.8 THOU/UL (ref 0–0.9)
MONOCYTES NFR BLD AUTO: 13 % (ref 2–10)
NEUTROPHILS # BLD AUTO: 2.7 THOU/UL (ref 2–7.7)
NEUTROPHILS NFR BLD AUTO: 47 % (ref 50–70)
NITRATE UR QL: NEGATIVE
PH UR STRIP: 6 [PH] (ref 5–8)
PLATELET # BLD AUTO: 390 THOU/UL (ref 140–440)
PMV BLD AUTO: 9.8 FL (ref 7–10)
RBC # BLD AUTO: 4.86 MILL/UL (ref 3.8–5.4)
SP GR UR STRIP: 1.02 (ref 1–1.03)
UROBILINOGEN UR STRIP-ACNC: NORMAL
WBC: 5.8 THOU/UL (ref 4–11)

## 2021-06-26 ENCOUNTER — RECORDS - HEALTHEAST (OUTPATIENT)
Dept: LAB | Facility: CLINIC | Age: 46
End: 2021-06-26

## 2021-06-26 LAB
SARS-COV-2 PCR COMMENT: NORMAL
SARS-COV-2 RNA SPEC QL NAA+PROBE: NEGATIVE
SARS-COV-2 VIRUS SPECIMEN SOURCE: NORMAL

## 2021-06-27 ENCOUNTER — HEALTH MAINTENANCE LETTER (OUTPATIENT)
Age: 46
End: 2021-06-27

## 2021-07-06 VITALS
HEART RATE: 62 BPM | TEMPERATURE: 98.6 F | RESPIRATION RATE: 14 BRPM | WEIGHT: 204 LBS | DIASTOLIC BLOOD PRESSURE: 69 MMHG | SYSTOLIC BLOOD PRESSURE: 103 MMHG | BODY MASS INDEX: 32.43 KG/M2 | OXYGEN SATURATION: 97 %

## 2021-07-07 NOTE — PROGRESS NOTES
Assessment & Plan:       1. Muscle strain  HM1(CBC and Differential)    Urinalysis-UC if Indicated    cyclobenzaprine (FLEXERIL) 5 MG tablet      Medical Decision Making  Patient presents with acute onset right lower back pain.  Urinalysis was negative for urinary tract infection.  Also no hematuria to make concern for kidney stone very low.  CBC shows normal white blood cell count to reduce concern for appendicitis.  Patient's low back pain is reproducible to palpation of the right lumbosacral paraspinal muscles.  Straight leg raise is negative bilaterally with no signs of radiculopathy.  Symptoms appear most consistent with a muscle strain of the right lower back.  Recommend patient rest with occasional light stretching, use over-the-counter analgesics, and try a short course of muscle relaxer to help with nighttime symptoms.  Provided note for work.  Discussed signs of worsening symptoms and when to follow-up with PCP if no symptom improvement.    Patient Instructions   You were seen today for a muscle strain.    Symptom management:  - Take the flexeril to relax the muscle, start with just 5 mg, if you tolerate that alright may take up to 10 mg at a time up to 3 times a day as needed.  - May use acetaminophen 500-1000 mg for first 3 hours (not to exceed 4000 mg in one day), then ibuprofen 400-600 mg with food for the next 3 hours, and alternate as needed  - Heat pads as tolerated  - Rest with occasional light stretching    Reasons to be seen immediately in the emergency room:  - Develop numbness or tingling in the groin or legs  - Sharp shooting pain down to your legs  - Loss of bowel or bladder function    Otherwise, if pain is not improving after 1 week, return for re-evaluation or see your primary care provider.          Subjective:       Lindsey Gavin is a 45 y.o. female here for evaluation of right lower back pain.  Onset of symptoms was 1 to 2 days ago.  Patient has had symptoms like this before  several months ago that improved on its own.  She did note doing heavy lifting at her work about 1 week ago, but did not note significant pain at that time.  She now notes pain in the right lower back that is worse when she goes from sitting to standing or when she is twisting her upper torso.  The pain does radiate to the right lower quadrant abdominal region.  Patient has had right lower quadrant abdominal pains chronically due to her history of fibroids.  She does note some occasional chills and increased urinary frequency, but denies having active fevers, dysuria, nausea, and vomiting.    The following portions of the patient's history were reviewed and updated as appropriate: allergies, current medications and problem list.    Review of Systems  Pertinent items are noted in HPI.     Allergies  No Known Allergies    Family History   Problem Relation Age of Onset     Stroke Mother      Hypertension Mother      Other Father         influenza like illness     No Medical Problems Sister      No Medical Problems Brother      No Medical Problems Sister      No Medical Problems Sister      No Medical Problems Sister      No Medical Problems Sister      No Medical Problems Sister      No Medical Problems Brother      Breast cancer Neg Hx      Skin cancer Neg Hx      Colon cancer Neg Hx        Social History     Socioeconomic History     Marital status:      Spouse name: None     Number of children: None     Years of education: None     Highest education level: None   Occupational History     Occupation: lpn   Social Needs     Financial resource strain: None     Food insecurity     Worry: None     Inability: None     Transportation needs     Medical: None     Non-medical: None   Tobacco Use     Smoking status: Never Smoker     Smokeless tobacco: Never Used   Substance and Sexual Activity     Alcohol use: No     Alcohol/week: 0.0 - 1.0 standard drinks     Drug use: No     Sexual activity: Yes     Partners: Male      Birth control/protection: Condom   Lifestyle     Physical activity     Days per week: None     Minutes per session: None     Stress: None   Relationships     Social connections     Talks on phone: None     Gets together: None     Attends [employer] service: None     Active member of club or organization: None     Attends meetings of clubs or organizations: None     Relationship status: None     Intimate partner violence     Fear of current or ex partner: None     Emotionally abused: None     Physically abused: None     Forced sexual activity: None   Other Topics Concern     None   Social History Narrative    10/9/2018 - moved to [place] from [place]    11/10/2020 lives with  and three sons age 15, 13, 2.          Objective:       /69 (Patient Site: Left Arm, Patient Position: Sitting, Cuff Size: Adult Large)   Pulse 62   Temp 98.6  F (37  C) (Oral)   Resp 14   Wt 204 lb (92.5 kg)   SpO2 97%   BMI 32.43 kg/m    General appearance: alert, appears stated age, cooperative, no distress and non-toxic  Back: No CVA tenderness, tenderness to the right lumbosacral paraspinal muscles  Lungs: clear to auscultation bilaterally  Heart: regular rate and rhythm, S1, S2 normal, no murmur, click, rub or gallop  Abdomen: Mild tenderness to palpation in the right lower quadrant region, otherwise abdomen soft, normal bowel sounds, no masses organomegaly  Skin: Skin color, texture, turgor normal. No rashes or lesions  Neurologic: Straight leg raise is negative bilaterally, however patient does have increased low back pain when she activates her muscles     Lab Results    Recent Results (from the past 24 hour(s))   Urinalysis-UC if Indicated   Result Value Ref Range    Color, UA Yellow Colorless, Yellow, Straw, Light Yellow    Clarity, UA Clear Clear    Glucose, UA Negative Negative    Protein, UA Negative Negative    Bilirubin, UA Negative Negative    Urobilinogen, UA 0.2 E.U./dL 0.2 E.U./dL, 1.0 E.U./dL    pH, UA 6.0 5.0  - 8.0    Blood, UA Negative Negative    Ketones, UA Negative Negative    Nitrite, UA Negative Negative    Leukocytes, UA Negative Negative    Specific Gravity, UA 1.020 1.005 - 1.030   HM1 (CBC with Diff)   Result Value Ref Range    WBC 5.8 4.0 - 11.0 thou/uL    RBC 4.86 3.80 - 5.40 mill/uL    Hemoglobin 14.7 12.0 - 16.0 g/dL    Hematocrit 44.4 35.0 - 47.0 %    MCV 91 80 - 100 fL    MCH 30.2 27.0 - 34.0 pg    MCHC 33.1 32.0 - 36.0 g/dL    RDW 12.6 11.0 - 14.5 %    Platelets 390 140 - 440 thou/uL    MPV 9.8 7.0 - 10.0 fL    Neutrophils % 47 (L) 50 - 70 %    Lymphocytes % 39 20 - 40 %    Monocytes % 13 (H) 2 - 10 %    Eosinophils % 1 0 - 6 %    Basophils % 1 0 - 2 %    Immature Granulocyte % 0 <=0 %    Neutrophils Absolute 2.7 2.0 - 7.7 thou/uL    Lymphocytes Absolute 2.3 0.8 - 4.4 thou/uL    Monocytes Absolute 0.8 0.0 - 0.9 thou/uL    Eosinophils Absolute 0.0 0.0 - 0.4 thou/uL    Basophils Absolute 0.0 0.0 - 0.2 thou/uL    Immature Granulocyte Absolute 0.0 <=0.0 thou/uL     I personally reviewed these results and discussed findings with the patient.

## 2021-07-07 NOTE — PATIENT INSTRUCTIONS - HE
You were seen today for a muscle strain.    Symptom management:  - Take the flexeril to relax the muscle, start with just 5 mg, if you tolerate that alright may take up to 10 mg at a time up to 3 times a day as needed.  - May use acetaminophen 500-1000 mg for first 3 hours (not to exceed 4000 mg in one day), then ibuprofen 400-600 mg with food for the next 3 hours, and alternate as needed  - Heat pads as tolerated  - Rest with occasional light stretching    Reasons to be seen immediately in the emergency room:  - Develop numbness or tingling in the groin or legs  - Sharp shooting pain down to your legs  - Loss of bowel or bladder function    Otherwise, if pain is not improving after 1 week, return for re-evaluation or see your primary care provider.

## 2021-07-07 NOTE — LETTER
Letter by Fer Marquis PA-C at      Author: Fer Marquis PA-C Service: -- Author Type: --    Filed:  Encounter Date: 6/25/2021 Status: (Other)         June 25, 2021     Patient: Lindsey Gavin   YOB: 1975   Date of Visit: 6/25/2021       To Whom it May Concern:    Lindsey Gavin was seen in my clinic on 6/25/2021.  She is excused from work for 6/25/2021 - 7/2/2021.  She may return to work sooner, but should only do light duties through 7/2/2021.    If you have any questions or concerns, please don't hesitate to call.    Sincerely,         Electronically signed by Fer Marquis PA-C

## 2021-08-03 ENCOUNTER — LAB REQUISITION (OUTPATIENT)
Dept: LAB | Facility: CLINIC | Age: 46
End: 2021-08-03
Payer: COMMERCIAL

## 2021-08-03 DIAGNOSIS — U07.1 COVID-19: ICD-10-CM

## 2021-08-05 ENCOUNTER — LAB REQUISITION (OUTPATIENT)
Dept: LAB | Facility: CLINIC | Age: 46
End: 2021-08-05
Payer: COMMERCIAL

## 2021-08-05 DIAGNOSIS — U07.1 COVID-19: ICD-10-CM

## 2021-08-06 ENCOUNTER — LAB REQUISITION (OUTPATIENT)
Dept: LAB | Facility: CLINIC | Age: 46
End: 2021-08-06
Payer: COMMERCIAL

## 2021-08-06 DIAGNOSIS — U07.1 COVID-19: ICD-10-CM

## 2021-08-10 ENCOUNTER — LAB REQUISITION (OUTPATIENT)
Dept: LAB | Facility: CLINIC | Age: 46
End: 2021-08-10
Payer: COMMERCIAL

## 2021-08-10 DIAGNOSIS — U07.1 COVID-19: ICD-10-CM

## 2021-08-13 ENCOUNTER — LAB REQUISITION (OUTPATIENT)
Dept: LAB | Facility: CLINIC | Age: 46
End: 2021-08-13
Payer: COMMERCIAL

## 2021-08-13 DIAGNOSIS — U07.1 COVID-19: ICD-10-CM

## 2021-08-17 ENCOUNTER — LAB REQUISITION (OUTPATIENT)
Dept: LAB | Facility: CLINIC | Age: 46
End: 2021-08-17
Payer: COMMERCIAL

## 2021-08-17 DIAGNOSIS — U07.1 COVID-19: ICD-10-CM

## 2021-08-20 ENCOUNTER — LAB REQUISITION (OUTPATIENT)
Dept: LAB | Facility: CLINIC | Age: 46
End: 2021-08-20
Payer: COMMERCIAL

## 2021-08-20 DIAGNOSIS — U07.1 COVID-19: ICD-10-CM

## 2021-08-23 ENCOUNTER — LAB REQUISITION (OUTPATIENT)
Dept: LAB | Facility: CLINIC | Age: 46
End: 2021-08-23
Payer: COMMERCIAL

## 2021-08-23 DIAGNOSIS — U07.1 COVID-19: ICD-10-CM

## 2021-08-27 ENCOUNTER — LAB REQUISITION (OUTPATIENT)
Dept: LAB | Facility: CLINIC | Age: 46
End: 2021-08-27
Payer: COMMERCIAL

## 2021-08-27 DIAGNOSIS — U07.1 COVID-19: ICD-10-CM

## 2021-08-31 ENCOUNTER — LAB REQUISITION (OUTPATIENT)
Dept: LAB | Facility: CLINIC | Age: 46
End: 2021-08-31
Payer: COMMERCIAL

## 2021-08-31 DIAGNOSIS — U07.1 COVID-19: ICD-10-CM

## 2021-09-10 ENCOUNTER — LAB REQUISITION (OUTPATIENT)
Dept: LAB | Facility: CLINIC | Age: 46
End: 2021-09-10
Payer: COMMERCIAL

## 2021-09-10 DIAGNOSIS — U07.1 COVID-19: ICD-10-CM

## 2021-09-14 ENCOUNTER — LAB REQUISITION (OUTPATIENT)
Dept: LAB | Facility: CLINIC | Age: 46
End: 2021-09-14
Payer: COMMERCIAL

## 2021-09-14 DIAGNOSIS — U07.1 COVID-19: ICD-10-CM

## 2021-09-15 PROCEDURE — U0005 INFEC AGEN DETEC AMPLI PROBE: HCPCS | Mod: ORL | Performed by: FAMILY MEDICINE

## 2021-09-16 ENCOUNTER — LAB REQUISITION (OUTPATIENT)
Dept: LAB | Facility: CLINIC | Age: 46
End: 2021-09-16
Payer: COMMERCIAL

## 2021-09-16 DIAGNOSIS — U07.1 COVID-19: ICD-10-CM

## 2021-09-16 LAB — SARS-COV-2 RNA RESP QL NAA+PROBE: NEGATIVE

## 2021-09-21 ENCOUNTER — LAB REQUISITION (OUTPATIENT)
Dept: LAB | Facility: CLINIC | Age: 46
End: 2021-09-21
Payer: COMMERCIAL

## 2021-09-21 DIAGNOSIS — U07.1 COVID-19: ICD-10-CM

## 2021-10-12 ENCOUNTER — OFFICE VISIT (OUTPATIENT)
Dept: FAMILY MEDICINE | Facility: CLINIC | Age: 46
End: 2021-10-12
Payer: COMMERCIAL

## 2021-10-12 VITALS
SYSTOLIC BLOOD PRESSURE: 121 MMHG | HEART RATE: 70 BPM | OXYGEN SATURATION: 98 % | TEMPERATURE: 98.1 F | DIASTOLIC BLOOD PRESSURE: 82 MMHG | BODY MASS INDEX: 32.43 KG/M2 | RESPIRATION RATE: 16 BRPM | WEIGHT: 204 LBS

## 2021-10-12 DIAGNOSIS — R05.9 COUGH: ICD-10-CM

## 2021-10-12 DIAGNOSIS — R09.81 CONGESTION OF PARANASAL SINUS: Primary | ICD-10-CM

## 2021-10-12 PROCEDURE — 99213 OFFICE O/P EST LOW 20 MIN: CPT | Performed by: PHYSICIAN ASSISTANT

## 2021-10-12 RX ORDER — FLUTICASONE PROPIONATE 50 MCG
2 SPRAY, SUSPENSION (ML) NASAL DAILY
Qty: 16 G | Refills: 0 | Status: SHIPPED | OUTPATIENT
Start: 2021-10-12 | End: 2022-09-16

## 2021-10-12 NOTE — PATIENT INSTRUCTIONS
You were seen today for a cough. This is likely due to a virus and will improve over the next 1-2 weeks on its own.    Symptom management:  - Drink plenty of non-caffeinated fluids  - Avoid smoke exposure  - May use tylenol or ibuprofen for discomfort  - Drink a warm non-caffeinated tea with honey  - Place a warm humidifier in your bedroom at night  - Azar's VaporRub    Reasons to return for re-evaluation:  - Develop a fever 100.4 or higher, current fever worsens, or fever does not improve in 72 hours  - Difficulty breathing or shortness of breath  - Cough continues to worsen including coughing up blood or coughing up thick, colored phlegm  - Unable to tolerate fluids    Otherwise, if symptoms have not improved in 7 days, follow-up with your primary care provider.    You were seen today for sinus congestion and/or pain. This is likely due to a viral illness.    Symptoms management:  - May use Tylenol or Ibuprofen for discomfort and/or fever if present  - May try saline irrigation to relieve congestion (see instructions below)  - Use of nasal steroids (Flonase) as prescribed    Reasons to come back for re-evaluation:  - Develop a fever of 100.4F or current fever worsens  - Sudden and severe pain in the face and head  - Troubles seeing or double vision  - Swelling or redness around one or both eyes  - Sfiff neck  - Symptoms have not improved after 7 days    Buffered normal saline nasal irrigation   The benefits   1. Saline (saltwater) washes the mucus and irritants from your nose.   2. The sinus passages are moisturized.   3. Studies have also shown that a nasal irrigation improves cell function (the cells that move the mucus work better).   The recipe   Use a one-quart glass jar that is thoroughly cleansed.   You may use a large medical syringe (30 cc), water pick with an irrigation tip (preferred method), squeeze bottle, or Neti pot. Do not use a baby bulb syringe. The syringe or pick should be sterilized frequently  or replaced every two to three weeks to avoid contamination and infection.   Fill with water that has been distilled, previously boiled, or otherwise sterilized. Plain tap water is not recommended, because it is not necessarily sterile.   Add 1 to 1  heaping teaspoons of pickling/gamaliel salt. Do not use table salt, because it contains a large number of additives.   Add 1 teaspoon of baking soda (pure bicarbonate).   Mix ingredients together, and store at room temperature. Discard after one week.   You may also make up a solution from premixed packets that are commercially prepared specifically for nasal irrigation.   The instructions   Irrigate your nose with saline one to two times per day.   If you have been told to use nasal medication, you should always use your saline solution first. The nasal medication is much more effective when sprayed onto clean nasal membranes, and the spray will reach deeper into the nose.   Pour the amount of fluid you plan to use into a clean bowl. Do not put your used syringe back into the storage container, because it contaminates your solution.   You may warm the solution slightly in the microwave, but be sure that the solution is not hot.   Bend over the sink (some people do this in the shower) and squirt the solution into each side of your nose, aiming the stream toward the back of your head, not the top of your head. The solution should flow into one nostril and out of the other, but it will not harm you if you swallow a little.   Some people experience a little burning sensation the first few times they use buffered saline solution, but this usually goes away after they adapt to it.

## 2021-10-12 NOTE — PROGRESS NOTES
Assessment & Plan:      Problem List Items Addressed This Visit     None      Visit Diagnoses     Congestion of paranasal sinus    -  Primary    Relevant Medications    fluticasone (FLONASE) 50 MCG/ACT nasal spray    Cough        Relevant Medications    fluticasone (FLONASE) 50 MCG/ACT nasal spray        Medical Decision Making  Patient presents with ongoing cough particularly worse at nighttime.  Low concern for pneumonia as patient has clear lung auscultation, no signs of respiratory distress, and 98% oxygen saturation.  Feel patient is likely still recovering from a viral upper respiratory infection.  Will address her sinus congestion as this drainage is likely resulting in her cough.  Recommend trial of nasal steroids, staying well-hydrated, humidifiers, and propping head up at night.  May also continue with honey and Vicks VapoRub.  Discussed signs of worsening symptoms and when to follow-up with PCP if no symptom improvement.     Subjective:      Lindsey Gavin is a 46 year old female here for evaluation of ongoing cough at night.  Onset of symptoms was 9/21.  Patient had cough, rhinorrhea, and sore throat.  Patient tested negative for COVID-19 on 10/1.  She now notes having ongoing cough that is worse at nighttime.  Cough is occasionally productive with some chest heaviness and some left lower chest wall sharp pains.  Pain is worse during her coughing and is sharp pains have since improved.  She denies feeling short of breath and no fevers.     The following portions of the patient's history were reviewed and updated as appropriate: allergies, current medications, and problem list.     Review of Systems  Pertinent items are noted in HPI.    Allergies  No Known Allergies    Family History   Problem Relation Age of Onset     Cerebrovascular Disease Mother      Hypertension Mother      Other - See Comments Father         influenza like illness     No Known Problems Sister      No Known Problems  Brother      No Known Problems Sister      No Known Problems Sister      No Known Problems Sister      No Known Problems Sister      No Known Problems Sister      No Known Problems Brother      Breast Cancer No family hx of      Skin Cancer No family hx of      Colon Cancer No family hx of        Social History     Tobacco Use     Smoking status: Never Smoker     Smokeless tobacco: Never Used   Substance Use Topics     Alcohol use: No     Alcohol/week: 0.0 - 1.0 standard drinks        Objective:      /82   Pulse 70   Temp 98.1  F (36.7  C) (Oral)   Resp 16   Wt 92.5 kg (204 lb)   SpO2 98%   BMI 32.43 kg/m    General appearance - alert, well appearing, and in no distress and non-toxic  Ears - TMs intact with mild serous fluid and moderate bulging bilaterally, no erythema, external ears normal  Nose - normal and patent, no erythema, discharge or polyps  Mouth - mucous membranes moist, pharynx normal without lesions  Neck - supple, no significant adenopathy  Chest - clear to auscultation, no wheezes, rales or rhonchi, symmetric air entry  Heart - normal rate, regular rhythm, normal S1, S2, no murmurs, rubs, clicks or gallops     Lab & Imaging Results    No results found for this or any previous visit (from the past 24 hour(s)).    I personally reviewed these results and discussed findings with the patient.

## 2021-10-16 ENCOUNTER — HEALTH MAINTENANCE LETTER (OUTPATIENT)
Age: 46
End: 2021-10-16

## 2021-11-22 ENCOUNTER — LAB REQUISITION (OUTPATIENT)
Dept: LAB | Facility: CLINIC | Age: 46
End: 2021-11-22
Payer: COMMERCIAL

## 2021-11-22 DIAGNOSIS — U07.1 COVID-19: ICD-10-CM

## 2021-11-23 ENCOUNTER — LAB REQUISITION (OUTPATIENT)
Dept: LAB | Facility: CLINIC | Age: 46
End: 2021-11-23
Payer: COMMERCIAL

## 2021-11-23 DIAGNOSIS — U07.1 COVID-19: ICD-10-CM

## 2021-11-26 ENCOUNTER — LAB REQUISITION (OUTPATIENT)
Dept: LAB | Facility: CLINIC | Age: 46
End: 2021-11-26
Payer: COMMERCIAL

## 2021-11-26 DIAGNOSIS — U07.1 COVID-19: ICD-10-CM

## 2021-12-01 ENCOUNTER — LAB REQUISITION (OUTPATIENT)
Dept: LAB | Facility: CLINIC | Age: 46
End: 2021-12-01
Payer: COMMERCIAL

## 2021-12-01 ENCOUNTER — E-VISIT (OUTPATIENT)
Dept: URGENT CARE | Facility: CLINIC | Age: 46
End: 2021-12-01
Payer: COMMERCIAL

## 2021-12-01 DIAGNOSIS — U07.1 COVID-19: ICD-10-CM

## 2021-12-01 DIAGNOSIS — Z20.822 SUSPECTED COVID-19 VIRUS INFECTION: Primary | ICD-10-CM

## 2021-12-01 PROCEDURE — 99421 OL DIG E/M SVC 5-10 MIN: CPT | Performed by: PHYSICIAN ASSISTANT

## 2021-12-02 ENCOUNTER — LAB REQUISITION (OUTPATIENT)
Dept: LAB | Facility: CLINIC | Age: 46
End: 2021-12-02
Payer: COMMERCIAL

## 2021-12-02 DIAGNOSIS — U07.1 COVID-19: ICD-10-CM

## 2021-12-02 NOTE — PATIENT INSTRUCTIONS
Esperanza Gavin,    Your symptoms show that you may have coronavirus (COVID-19). This illness can cause fever, cough and trouble breathing. Many people get a mild case and get better on their own. Some people can get very sick.    Your children will need to complete their own Evisits in order to be tested.    Will I be tested for COVID-19?  We would like to test you for Covid-19 virus. I have placed orders for this test.     To schedule: go to your [employer] home page and scroll down to the section that says  You have an appointment that needs to be scheduled  and click the large green button that says  Schedule Now  and follow the steps to find the next available openings.    If you are unable to complete these [employer] scheduling steps, please call [phone] to schedule your testing.     Return to work/school/ guidance:  Please let your workplace manager and staffing office know when your quarantine ends     We can t give you an exact date as it depends on the above. You can calculate this on your own or work with your manager/staffing office to calculate this. (For example if you were exposed on 10/4, you would have to quarantine for 14 full days. That would be through 10/18. You could return on 10/19.)      If you receive a positive COVID-19 test result, follow the guidance of the those who are giving you the results. Usually the return to work is 10 (or in some cases 20 days from symptom onset.) If you work at [facility], you must also be cleared by Employee Occupational Health and Safety to return to work.        If you receive a negative COVID-19 test result and did not have a high risk exposure to someone with a known positive COVID-19 test, you can return to work once you're free of fever for 24 hours without fever-reducing medication and your symptoms are improving or resolved.      If you receive a negative COVID-19 test and If you had a high risk exposure to someone who has  tested positive for COVID-19 then you can return to work 14 days after your last contact with the positive individual    Note: If you have ongoing exposure to the covid positive person, this quarantine period may be more than 14 days. (For example, if you are continued to be exposed to your child who tested positive and cannot isolate from them, then the quarantine of 7-14 days can't start until your child is no longer contagious. This is typically 10 days from onset of the child's symptoms. So the total duration may be 17-24 days in this case.)    Sign up for [employer].   We know it's scary to hear that you might have COVID-19. We want to track your symptoms to make sure you're okay over the next 2 weeks. Please look for an email from [employer]--this is a free, online program that we'll use to keep in touch. To sign up, follow the link in the email you will receive. Learn more at [email or web address]    How can I take care of myself?    Get lots of rest. Drink extra fluids (unless a doctor has told you not to)    Take Tylenol (acetaminophen) or ibuprofen for fever or pain. If you have liver or kidney problems, ask your family doctor if it's okay to take Tylenol o ibuprofen    If you have other health problems (like cancer, heart failure, an organ transplant or severe kidney disease): Call your specialty clinic if you don't feel better in the next 2 days.    Know when to call 911. Emergency warning signs include:  o Trouble breathing or shortness of breath  o Pain or pressure in the chest that doesn't go away  o Feeling confused like you haven't felt before, or not being able to wake up  o Bluish-colored lips or face    Where can I get more information?  [facility] [place] - About COVID-19:   [email or web address]    CDC - What to Do If You're Sick:   www.cdc.gov/coronavirus/2019-ncov/about/steps-when-sick.html    December 1, 2021  RE:  Immaculee I Leslye                                                                                                                   [address] [address]      To whom it may concern:    I evaluated Lindsey Gavin on December 1, 2021. Lindsey Gavin should be excused from work/school.     They should let their workplace manager and staffing office know when their quarantine ends.    We can not give an exact date as it depends on the information below. They can calculate this on their own or work with their manager/staffing office to calculate this. (For example if they were exposed on 10/04, they would have to quarantine for 14 full days. That would be through 10/18. They could return on 10/19.)    Quarantine Guidelines:      If patient receives a positive COVID-19 test result, they should follow the guidance of those who are giving the results. Usually the return to work is 10 (or in some cases 20 days from symptom onset.) If they work at [employer], they must be cleared by Employee Occupational Health and Safety to return to work.        If patient receives a negative COVID-19 test result and did not have a high risk exposure to someone with a known positive COVID-19 test, they can return to work once they're free of fever for 24 hours without fever-reducing medication and their symptoms are improving or resolved.      If patient receives a negative COVID-19 test and if they had a high risk exposure to someone who has tested positive for COVID-19 then they can return to work 14 days after their last contact with the positive individual    Note: If there is ongoing exposure to the covid positive person, this quarantine period may be longer than 14 days. (For example, if they are continually exposed to their child, who tested positive and cannot isolate from them, then the quarantine of 7-14 days can't start until their child is no longer contagious. This is typically 10 days from onset to the child's symptoms. So the total  duration may be 17-24 days in this case.)     Sincerely,  Marcelino Arellano PA-C

## 2021-12-04 ENCOUNTER — LAB (OUTPATIENT)
Dept: FAMILY MEDICINE | Facility: CLINIC | Age: 46
End: 2021-12-04
Attending: PHYSICIAN ASSISTANT
Payer: COMMERCIAL

## 2021-12-04 DIAGNOSIS — Z20.822 SUSPECTED COVID-19 VIRUS INFECTION: ICD-10-CM

## 2021-12-04 LAB — SARS-COV-2 RNA RESP QL NAA+PROBE: NEGATIVE

## 2021-12-04 PROCEDURE — U0003 INFECTIOUS AGENT DETECTION BY NUCLEIC ACID (DNA OR RNA); SEVERE ACUTE RESPIRATORY SYNDROME CORONAVIRUS 2 (SARS-COV-2) (CORONAVIRUS DISEASE [COVID-19]), AMPLIFIED PROBE TECHNIQUE, MAKING USE OF HIGH THROUGHPUT TECHNOLOGIES AS DESCRIBED BY CMS-2020-01-R: HCPCS

## 2021-12-04 PROCEDURE — U0005 INFEC AGEN DETEC AMPLI PROBE: HCPCS

## 2021-12-11 ENCOUNTER — HEALTH MAINTENANCE LETTER (OUTPATIENT)
Age: 46
End: 2021-12-11

## 2021-12-14 ENCOUNTER — OFFICE VISIT (OUTPATIENT)
Dept: FAMILY MEDICINE | Facility: CLINIC | Age: 46
End: 2021-12-14
Payer: COMMERCIAL

## 2021-12-14 VITALS
TEMPERATURE: 98.2 F | BODY MASS INDEX: 32.75 KG/M2 | OXYGEN SATURATION: 98 % | HEART RATE: 69 BPM | RESPIRATION RATE: 14 BRPM | DIASTOLIC BLOOD PRESSURE: 78 MMHG | WEIGHT: 206 LBS | SYSTOLIC BLOOD PRESSURE: 115 MMHG

## 2021-12-14 DIAGNOSIS — R07.0 THROAT PAIN: Primary | ICD-10-CM

## 2021-12-14 DIAGNOSIS — Z20.828 EXPOSURE TO INFLUENZA: ICD-10-CM

## 2021-12-14 LAB
DEPRECATED S PYO AG THROAT QL EIA: NEGATIVE
FLUAV AG SPEC QL IA: NEGATIVE
FLUBV AG SPEC QL IA: NEGATIVE

## 2021-12-14 PROCEDURE — 99213 OFFICE O/P EST LOW 20 MIN: CPT | Performed by: PHYSICIAN ASSISTANT

## 2021-12-14 PROCEDURE — 87651 STREP A DNA AMP PROBE: CPT | Performed by: PHYSICIAN ASSISTANT

## 2021-12-14 PROCEDURE — 87804 INFLUENZA ASSAY W/OPTIC: CPT | Performed by: PHYSICIAN ASSISTANT

## 2021-12-14 NOTE — LETTER
[facility]  [address] [address]  Phone: [phone]  Fax: [phone]    December 14, 2021        Lindsey Gavin  [address] [address]          To whom it may concern:    RE: Lindsey Gavin    Patient was seen and treated today at our clinic and missed work 12/14/21.    How can I protect others? Discharge Instructions for COVID-19 precaustions:  If you have symptoms (fever, cough, body aches or trouble breathing):    Stay home and away from others (self-isolate) until:  ? At least 10 days have passed since your symptoms started. And   ? You've had no fever--and no medicine that reduces fever--for 1 full day (24 hours). And   Your other symptoms have resolved (gotten better) OR you have a negative covid test.    Please contact me for questions or concerns.      Sincerely,        Flo Mera PA-C

## 2021-12-14 NOTE — PATIENT INSTRUCTIONS
Suggested increased rest increased fluids and bedside humidification  Over-the-counter Tylenol for comfort.  Follow packaging directions  Over-the-counter throat lozenges with benzocaine such as Cepacol may be used if indicated and is not a choking hazard based on age.  Follow packaging directions.  Do not overuse the benzocaine as it will dry the throat and make it uncomfortable.  Follow up with primary care provider if you do not get resolution with the course of treatment.  Return to walk-in care if complication or new symptoms arise in the interim.        Self-Care for Sore Throats  Sore throats happen for many reasons, such as colds, allergies, and infections caused by viruses or bacteria. In any case, your throat becomes red and sore. Your goal for self-care is to reduce your discomfort while giving your throat a chance to heal.    Moisten and soothe your throat  Tips include the following:    Try a sip of water first thing after waking up.    Keep your throat moist by drinking 6 or more glasses of clear liquids every day.    Run a cool-air humidifier in your room overnight.    Avoid cigarette smoke.     Suck on throat lozenges, cough drops, hard candy, ice chips, or frozen fruit-juice bars. Use the sugar-free versions if your diet or medical condition requires them.  Gargle to ease irritation  Gargling every hour or 2 can ease irritation. Try gargling with 1 of these solutions:    1/4 teaspoon of salt in 1/2 cup of warm water    An over-the-counter anesthetic gargle  Use medicine for more relief  Over-the-counter medicine can reduce sore throat symptoms. Ask your pharmacist if you have questions about which medicine to use:    Ease pain with anesthetic sprays. Aspirin or an aspirin substitute also helps. Remember, never give aspirin to anyone 18 or younger, or if you are already taking blood thinners.     For sore throats caused by allergies, try antihistamines to block the allergic reaction.    Remember:  "unless a sore throat is caused by a bacterial infection, antibiotics won t help you.  Prevent future sore throats  Prevention tips include the following:    Stop smoking or reduce contact with secondhand smoke. Smoke irritates the tender throat lining.    Limit contact with pets and with allergy-causing substances, such as pollen and mold.    When you re around someone with a sore throat or cold, wash your hands often to keep viruses or bacteria from spreading.    Don t strain your vocal cords.  Call your healthcare provider  Contact your healthcare provider if you have:    A temperature over 101 F (38.3 C)    White spots on the throat    Great difficulty swallowing    Trouble breathing    A skin rash    Recent exposure to someone else with strep bacteria    Severe hoarseness and swollen glands in the neck or jaw   Date Last Reviewed: 8/1/2016 2000-2016 [employer]. [address] [place] [address]. All rights reserved. This information is not intended as a substitute for professional medical care. Always follow your healthcare professional's instructions.        Patient Education     Viral Syndrome (Adult)  A viral illness may cause a number of symptoms such as fever. Other symptoms depend on the part of the body that the virus affects. If it settles in your nose, throat, and lungs, it may cause cough, sore throat, congestion, runny nose, headache, earache and other ear symptoms, or shortness of breath. If it settles in your stomach and intestinal tract, it may cause nausea, vomiting, cramping, and diarrhea. Sometimes it causes generalized symptoms like \"aching all over,\" feeling tired, loss of energy, or loss of appetite.  A viral illness usually lasts anywhere from several days to several weeks, but sometimes it lasts longer. In some cases, a more serious infection can look like a viral syndrome in the first few days of the illness. You may need another exam and additional tests to know " the difference. Watch for the warning signs listed below for when to seek medical advice.  Home care  Follow these guidelines for taking care of yourself at home:    If symptoms are severe, rest at home for the first 2 to 3 days.    Stay away from cigarette smoke - both your smoke and the smoke from others.    You may use over-the-counter acetaminophen or ibuprofen for fever, muscle aching, and headache, unless another medicine was prescribed for this. If you have chronic liver or kidney disease or ever had a stomach ulcer or gastrointestinal bleeding, talk with your healthcare provider before using these medicines. No one who is younger than 18 and ill with a fever should take aspirin. It may cause severe disease or death.    Your appetite may be poor, so a light diet is fine. Avoid dehydration by drinking 8 to 12, 8-ounce glasses of fluids each day. This may include water; orange juice; lemonade; apple, grape, and cranberry juice; clear fruit drinks; electrolyte replacement and sports drinks; and decaffeinated teas and coffee. If you have been diagnosed with a kidney disease, ask your healthcare provider how much and what types of fluids you should drink to prevent dehydration. If you have kidney disease, drinking too much fluid can cause it build up in the your body and be dangerous to your health.    Over-the-counter remedies won't shorten the length of the illness but may be helpful for symptoms such as cough, sore throat, nasal and sinus congestion, or diarrhea. Don't use decongestants if you have high blood pressure.  Follow-up care  Follow up with your healthcare provider if you do not improve over the next week.  Call 911  Call 911 if any of the following occur:    Convulsion    Feeling weak, dizzy, or like you are going to faint    Chest pain, or more than mild shortness of breath  When to seek medical advice  Call your healthcare provider right away if any of these occur:    Cough with lots of colored  sputum (mucus) or blood in your sputum    Chest pain, shortness of breath, wheezing, or trouble breathing    Severe headache; face, neck, or ear pain    Severe, constant pain in the lower right side of your belly (abdominal)    Continued vomiting (can t keep liquids down)    Frequent diarrhea (more than 5 times a day); blood (red or black color) or mucus in diarrhea    Feeling weak, dizzy, or like you are going to faint    Extreme thirst    Fever of 100.4 F (38 C) or higher, or as directed by your healthcare provider  Remy last reviewed this educational content on 4/1/2018 2000-2021 [employer]. All rights reserved. This information is not intended as a substitute for professional medical care. Always follow your healthcare professional's instructions.

## 2021-12-14 NOTE — PROGRESS NOTES
Patient presents with:  Cough  Generalized Body Aches  Throat Pain  Nasal Congestion  SICK  x started last night  rapid covid is negative x today      Clinical Decision Making:  Strep test was obtained and was negative.  Culture is to follow.  COVID-19 screening test was negative this morning.  Symptomatic care was gone over. Expected course of resolution and indication for return was gone over and questions were answered to patient/parent's satisfaction before discharge.        ICD-10-CM    1. Throat pain  R07.0 Influenza A & B Antigen - Clinic Collect     Streptococcus A Rapid Screen w/Reflex to PCR - Clinic Collect     Group A Streptococcus PCR Throat Swab   2. Exposure to influenza  Z20.828        Patient Instructions     Suggested increased rest increased fluids and bedside humidification  Over-the-counter Tylenol for comfort.  Follow packaging directions  Over-the-counter throat lozenges with benzocaine such as Cepacol may be used if indicated and is not a choking hazard based on age.  Follow packaging directions.  Do not overuse the benzocaine as it will dry the throat and make it uncomfortable.  Follow up with primary care provider if you do not get resolution with the course of treatment.  Return to walk-in care if complication or new symptoms arise in the interim.        Self-Care for Sore Throats  Sore throats happen for many reasons, such as colds, allergies, and infections caused by viruses or bacteria. In any case, your throat becomes red and sore. Your goal for self-care is to reduce your discomfort while giving your throat a chance to heal.    Moisten and soothe your throat  Tips include the following:    Try a sip of water first thing after waking up.    Keep your throat moist by drinking 6 or more glasses of clear liquids every day.    Run a cool-air humidifier in your room overnight.    Avoid cigarette smoke.     Suck on throat lozenges, cough drops, hard candy, ice chips, or frozen fruit-juice  bars. Use the sugar-free versions if your diet or medical condition requires them.  Gargle to ease irritation  Gargling every hour or 2 can ease irritation. Try gargling with 1 of these solutions:    1/4 teaspoon of salt in 1/2 cup of warm water    An over-the-counter anesthetic gargle  Use medicine for more relief  Over-the-counter medicine can reduce sore throat symptoms. Ask your pharmacist if you have questions about which medicine to use:    Ease pain with anesthetic sprays. Aspirin or an aspirin substitute also helps. Remember, never give aspirin to anyone 18 or younger, or if you are already taking blood thinners.     For sore throats caused by allergies, try antihistamines to block the allergic reaction.    Remember: unless a sore throat is caused by a bacterial infection, antibiotics won t help you.  Prevent future sore throats  Prevention tips include the following:    Stop smoking or reduce contact with secondhand smoke. Smoke irritates the tender throat lining.    Limit contact with pets and with allergy-causing substances, such as pollen and mold.    When you re around someone with a sore throat or cold, wash your hands often to keep viruses or bacteria from spreading.    Don t strain your vocal cords.  Call your healthcare provider  Contact your healthcare provider if you have:    A temperature over 101 F (38.3 C)    White spots on the throat    Great difficulty swallowing    Trouble breathing    A skin rash    Recent exposure to someone else with strep bacteria    Severe hoarseness and swollen glands in the neck or jaw   Date Last Reviewed: 8/1/2016 2000-2016 [employer]. [address] [place] [address], [place] [address]. All rights reserved. This information is not intended as a substitute for professional medical care. Always follow your healthcare professional's instructions.        Patient Education     Viral Syndrome (Adult)  A viral illness may cause a number of symptoms such as fever.  "Other symptoms depend on the part of the body that the virus affects. If it settles in your nose, throat, and lungs, it may cause cough, sore throat, congestion, runny nose, headache, earache and other ear symptoms, or shortness of breath. If it settles in your stomach and intestinal tract, it may cause nausea, vomiting, cramping, and diarrhea. Sometimes it causes generalized symptoms like \"aching all over,\" feeling tired, loss of energy, or loss of appetite.  A viral illness usually lasts anywhere from several days to several weeks, but sometimes it lasts longer. In some cases, a more serious infection can look like a viral syndrome in the first few days of the illness. You may need another exam and additional tests to know the difference. Watch for the warning signs listed below for when to seek medical advice.  Home care  Follow these guidelines for taking care of yourself at home:    If symptoms are severe, rest at home for the first 2 to 3 days.    Stay away from cigarette smoke - both your smoke and the smoke from others.    You may use over-the-counter acetaminophen or ibuprofen for fever, muscle aching, and headache, unless another medicine was prescribed for this. If you have chronic liver or kidney disease or ever had a stomach ulcer or gastrointestinal bleeding, talk with your healthcare provider before using these medicines. No one who is younger than 18 and ill with a fever should take aspirin. It may cause severe disease or death.    Your appetite may be poor, so a light diet is fine. Avoid dehydration by drinking 8 to 12, 8-ounce glasses of fluids each day. This may include water; orange juice; lemonade; apple, grape, and cranberry juice; clear fruit drinks; electrolyte replacement and sports drinks; and decaffeinated teas and coffee. If you have been diagnosed with a kidney disease, ask your healthcare provider how much and what types of fluids you should drink to prevent dehydration. If you have " kidney disease, drinking too much fluid can cause it build up in the your body and be dangerous to your health.    Over-the-counter remedies won't shorten the length of the illness but may be helpful for symptoms such as cough, sore throat, nasal and sinus congestion, or diarrhea. Don't use decongestants if you have high blood pressure.  Follow-up care  Follow up with your healthcare provider if you do not improve over the next week.  Call 911  Call 911 if any of the following occur:    Convulsion    Feeling weak, dizzy, or like you are going to faint    Chest pain, or more than mild shortness of breath  When to seek medical advice  Call your healthcare provider right away if any of these occur:    Cough with lots of colored sputum (mucus) or blood in your sputum    Chest pain, shortness of breath, wheezing, or trouble breathing    Severe headache; face, neck, or ear pain    Severe, constant pain in the lower right side of your belly (abdominal)    Continued vomiting (can t keep liquids down)    Frequent diarrhea (more than 5 times a day); blood (red or black color) or mucus in diarrhea    Feeling weak, dizzy, or like you are going to faint    Extreme thirst    Fever of 100.4 F (38 C) or higher, or as directed by your healthcare provider  [employer] last reviewed this educational content on 4/1/2018 2000-2021 [employer]. All rights reserved. This information is not intended as a substitute for professional medical care. Always follow your healthcare professional's instructions.               HPI:  Lindsey Gavin is a 46 year old female who presents today along with her son for a 1 day cute onset of viral syndrome symptoms to include headache sore throat myalgias arthralgias chills and fatigue.  Patient shares that she has not had fever, vomiting, diarrhea, loss of taste or smell, shortness of breath loss of appetite.  Patient has not had a seasonal influenza immunization but she has had a  positive Covid vaccination.  Patient shares that she has had a rapid antigen test this morning that was returned as negative.  She has had exposure to a family member who has had influenza A.  She has not had antipyretic and tried no other treatment for this over-the-counter.    History obtained from chart review and the patient.    Problem List:  2020-11: Class 1 obesity without serious comorbidity with body mass   index (BMI) of 32.0 to 32.9 in adult  2019-04: Jaw pain  2018-10: Abnormal CT scan, chest  2018-10: Uterine fibroid  2018-10: Encounter for counseling regarding contraception  2018-10: Pimples  2018-10: Preventative health care      Past Medical History:   Diagnosis Date     Tinnitus, bilateral 10/9/2018       Social History     Tobacco Use     Smoking status: Never Smoker     Smokeless tobacco: Never Used   Substance Use Topics     Alcohol use: No     Alcohol/week: 0.0 - 1.0 standard drinks       Review of Systems  As above in HPI otherwise negative.    Vitals:    12/14/21 1438   BP: 115/78   Pulse: 69   Resp: 14   Temp: 98.2  F (36.8  C)   SpO2: 98%   Weight: 93.4 kg (206 lb)     General: Patient is resting comfortably no acute distress is afebrile  Patient does not appear to be acutely ill toxic dehydrated or febrile.  HEENT: Head is normocephalic atraumatic   eyes are PERRL EOMI sclera anicteric   TMs are clear bilaterally  Throat is without pharyngeal wall erythema and no exudate  No cervical lymphadenopathy present  LUNGS: Clear to auscultation bilaterally  HEART: Regular rate and rhythm  Skin: Without rash non-diaphoretic    Physical Exam    Labs:  Results for orders placed or performed in visit on 12/14/21   Influenza A & B Antigen - Clinic Collect     Status: Normal    Specimen: Nose; Swab   Result Value Ref Range    Influenza A antigen Negative Negative    Influenza B antigen Negative Negative    Narrative    Test results must be correlated with clinical data. If necessary, results should be  confirmed by a molecular assay or viral culture.   Streptococcus A Rapid Screen w/Reflex to PCR - Clinic Collect     Status: Normal    Specimen: Throat; Swab   Result Value Ref Range    Group A Strep antigen Negative Negative     Covid screening test was negative performed outside of clinic.    At the end of the encounter, I discussed results, diagnosis, medications. Discussed red flags for immediate return to clinic/ER, as well as indications for follow up if no improvement. Patient understood and agreed to plan. Patient was stable for discharge.

## 2021-12-15 LAB — GROUP A STREP BY PCR: NOT DETECTED

## 2022-05-26 ENCOUNTER — LAB REQUISITION (OUTPATIENT)
Dept: LAB | Facility: CLINIC | Age: 47
End: 2022-05-26
Payer: COMMERCIAL

## 2022-05-26 DIAGNOSIS — U07.1 COVID-19: ICD-10-CM

## 2022-06-01 ENCOUNTER — LAB REQUISITION (OUTPATIENT)
Dept: LAB | Facility: CLINIC | Age: 47
End: 2022-06-01
Payer: COMMERCIAL

## 2022-06-01 DIAGNOSIS — U07.1 COVID-19: ICD-10-CM

## 2022-09-16 ENCOUNTER — OFFICE VISIT (OUTPATIENT)
Dept: FAMILY MEDICINE | Facility: CLINIC | Age: 47
End: 2022-09-16
Payer: COMMERCIAL

## 2022-09-16 VITALS
BODY MASS INDEX: 31.37 KG/M2 | HEIGHT: 67 IN | SYSTOLIC BLOOD PRESSURE: 112 MMHG | DIASTOLIC BLOOD PRESSURE: 60 MMHG | WEIGHT: 199.9 LBS | HEART RATE: 59 BPM | RESPIRATION RATE: 16 BRPM | OXYGEN SATURATION: 98 %

## 2022-09-16 DIAGNOSIS — Z00.00 PREVENTATIVE HEALTH CARE: ICD-10-CM

## 2022-09-16 DIAGNOSIS — G89.29 CHRONIC LEFT SHOULDER PAIN: ICD-10-CM

## 2022-09-16 DIAGNOSIS — M25.512 CHRONIC LEFT SHOULDER PAIN: ICD-10-CM

## 2022-09-16 DIAGNOSIS — F41.8 TEST ANXIETY: ICD-10-CM

## 2022-09-16 PROBLEM — D64.9 ANEMIA: Status: ACTIVE | Noted: 2022-09-16

## 2022-09-16 PROBLEM — G43.909 MIGRAINE: Status: ACTIVE | Noted: 2022-09-16

## 2022-09-16 PROBLEM — M25.519 SHOULDER PAIN: Status: ACTIVE | Noted: 2019-04-02

## 2022-09-16 PROCEDURE — 99214 OFFICE O/P EST MOD 30 MIN: CPT | Mod: 25 | Performed by: FAMILY MEDICINE

## 2022-09-16 PROCEDURE — 90471 IMMUNIZATION ADMIN: CPT | Performed by: FAMILY MEDICINE

## 2022-09-16 PROCEDURE — 90686 IIV4 VACC NO PRSV 0.5 ML IM: CPT | Performed by: FAMILY MEDICINE

## 2022-09-16 RX ORDER — DROSPIRENONE 4 MG/1
1 TABLET, FILM COATED ORAL DAILY
COMMUNITY
Start: 2022-08-26 | End: 2023-04-20

## 2022-09-16 RX ORDER — CYCLOBENZAPRINE HCL 5 MG
5 TABLET ORAL 3 TIMES DAILY PRN
Qty: 30 TABLET | Refills: 0 | Status: SHIPPED | OUTPATIENT
Start: 2022-09-16 | End: 2023-04-20

## 2022-09-16 RX ORDER — CYCLOBENZAPRINE HCL 5 MG
TABLET ORAL
COMMUNITY
End: 2022-09-16

## 2022-09-16 ASSESSMENT — ENCOUNTER SYMPTOMS: HEADACHES: 1

## 2022-09-16 NOTE — PROGRESS NOTES
"      Problem List Items Addressed This Visit        Nervous and Auditory    Shoulder pain     We discussed that she has had left shoulder pain causes ruled out by neurology. She does have tense shoulder muscles. She says she has the pain on and off. She says she did have some relief from flexeril.     The shoulder pain also triggers headaches.     rx flexeril to be sparingly  Physical therapy also rx.            Relevant Medications    cyclobenzaprine (FLEXERIL) 5 MG tablet    Other Relevant Orders    Physical Therapy Referral       Other    Preventative health care     12/4/20 nl mammo           Test anxiety     Patient requests letter for test taking anxiety. This is written                Follow-up Visit   Expected date:  Sep 23, 2022 (Approximate)      Follow Up Appointment Details:     Follow-up with whom?: Me    Follow-Up for what?: Adult Preventive    How?: In Person    Is this an as-needed follow-up?: No                     Subjective   Immaculee is a 46 year old who presents for the following health issues chronic left shoulder pain, she says it is a shooting quality and flexeril helped in the past.   Chief Complaint   Patient presents with     Headache     X1 month, distrix sleeping, nerve spasm on left neck radiating down to shoulder and arm     Imm/Inj     Flu shot      Headache     Imm/Inj  Associated symptoms include headaches.   History of Present Illness       Headaches:   Since the patient's last clinic visit, headaches are: worsened  The patient is getting headaches:  Every other day.  She is not able to do normal daily activities when she has a migraine.  The patient is taking the following rescue/relief medications:  Tylenol   Patient states \"I get some relief\" from the rescue/relief medications.   The patient is taking the following medications to prevent migraines:  No medications to prevent migraines  In the past 4 weeks, the patient has gone to an Urgent Care or Emergency Room 0 times times " "due to headaches.    Reason for visit:  Nerve pain    She eats 2-3 servings of fruits and vegetables daily.She consumes 1 sweetened beverage(s) daily.She exercises with enough effort to increase her heart rate 10 to 19 minutes per day.  She exercises with enough effort to increase her heart rate 3 or less days per week.   She is taking medications regularly.       Review of Systems   Neurological: Positive for headaches.          Objective    /60 (BP Location: Left arm, Patient Position: Sitting, Cuff Size: Adult Regular)   Pulse 59   Resp 16   Ht 1.689 m (5' 6.5\")   Wt 90.7 kg (199 lb 14.4 oz)   SpO2 98%   BMI 31.78 kg/m    Body mass index is 31.78 kg/m .  Physical Exam  Constitutional:       Appearance: Normal appearance.   HENT:      Head: Normocephalic and atraumatic.   Cardiovascular:      Rate and Rhythm: Normal rate and regular rhythm.   Pulmonary:      Effort: Pulmonary effort is normal.   Musculoskeletal:         General: Normal range of motion.      Cervical back: Normal range of motion and neck supple.      Comments: She does have tense shoulder muscles.   Neurological:      General: No focal deficit present.      Mental Status: She is alert and oriented to person, place, and time.            This note has been dictated using voice recognition software. Any grammatical or context distortions are unintentional and inherent to the software      "

## 2022-09-16 NOTE — ASSESSMENT & PLAN NOTE
We discussed that she has had left shoulder pain causes ruled out by neurology. She does have tense shoulder muscles. She says she has the pain on and off. She says she did have some relief from flexeril.     The shoulder pain also triggers headaches.     rx flexeril to be sparingly  Physical therapy also rx.

## 2022-09-16 NOTE — LETTER
September 16, 2022      Lindsey Gavin  [address] [place] [id number]        To Whom It May Concern:    Lindsey Gavin  was seen on.9/16/2022 she has moderate test taking anxiety.  This makes it harder for her to perform on tests. She does not take anything for anxiety. Recommend any accommodations you can give her to help her with taking the test. A quiet test space free of distractions and extended time to take the test would be expected to help her.       Sincerely,        Darcy Yoon MD

## 2022-09-24 ENCOUNTER — THERAPY VISIT (OUTPATIENT)
Dept: PHYSICAL THERAPY | Facility: CLINIC | Age: 47
End: 2022-09-24
Attending: FAMILY MEDICINE
Payer: COMMERCIAL

## 2022-09-24 DIAGNOSIS — G89.29 CHRONIC LEFT SHOULDER PAIN: ICD-10-CM

## 2022-09-24 DIAGNOSIS — M25.512 CHRONIC LEFT SHOULDER PAIN: ICD-10-CM

## 2022-09-24 PROCEDURE — 97161 PT EVAL LOW COMPLEX 20 MIN: CPT | Mod: GP | Performed by: PHYSICAL THERAPIST

## 2022-09-24 PROCEDURE — 97110 THERAPEUTIC EXERCISES: CPT | Mod: GP | Performed by: PHYSICAL THERAPIST

## 2022-09-24 NOTE — PROGRESS NOTES
Physical Therapy Initial Evaluation - Shoulder    Therapist Impression:  Patient presents with signs and symptoms consistent with L shoulder pain secondary to possible cervical radiculopathy and myofascial pain with poor postural awareness - Patient apprehensive in clinic today with movements as she is having quite a bit of pain. Patient demonstrates impairments including decreased cervical and L shoulder range of motion, joint mobility and flexibility, with + impingement signs and neural tension testing. Patient's functional limitations include pushing/pulling, cleaning the house, turning her head, sleeping comfortably at night and performing daily tasks without having increased P symptoms    Subjective:  Lindsey Gavin is a 47 year old female. HPI given by patient  Patient's chief complaints: Patient with upper back pain and even into her neck and thru the shoulder blade that the pain comes and goes, she has had it since she woke up this morning, having P shooting all the way down to her gluteals and down the leg, Laying on her L side feels better than laying on the R side, she can fall asleep but wakes up in the morning with pain  Condition occurred due to unsure, started maybe 2 years ago when they did some MRI but saw nothing.  Date of Onset: 8/7/22 - she does drive move and sit more often  Location of symptoms is L shoulder blade and leg per patient report    Symptoms other than pain include: burning stays for longer periods of time, sharp happens occasionally     Pain dependence on time of day is: worse at end of the day.   Pain rating is: 8/10.    Symptoms are exacerbated by: moving and pushing, cleaning the house, sometimes she has to stop because of the increase of P symptoms  Symptoms are relieved by:  Rest and heat, OTC meds for temporary relief.    Progression of symptoms is that symptoms are:  About the same for the last 2 weeks, worse than when it started.    Imaging/Special tests include:  none at this time     Previous treatments include: no PT before but chiro before for the RLE pain symptoms after giving birth  Patient reports that general health is: good.     Current occupation is: student  Primary job tasks include: prolonged sitting, 3 children    Red flags include: none at this time    Patient's expectations for therapy include: decreasing pain, getting back to normal activity and housework, she wants to be able to exercise and lose weight    Pertinent medical history includes:    Past Medical History:   Diagnosis Date     Tinnitus, bilateral 10/9/2018     Surgical history includes:   Past Surgical History:   Procedure Laterality Date      SECTION        SECTION        SECTION         Current Outpatient Medications:      cyclobenzaprine (FLEXERIL) 5 MG tablet, Take 1 tablet (5 mg) by mouth 3 times daily as needed for muscle spasms, Disp: 30 tablet, Rfl: 0     Multiple Vitamin (MULTIVITAMIN ADULT PO), Multi Vitamin, Disp: , Rfl:      SLYND 4 MG TABS, Take 1 tablet by mouth daily, Disp: , Rfl:     SHOULDER:    Cervical Screen: rotation 50 to R, 34 to L with P; SB to R 26, to L 20; Flex 15, ext 25    Shoulder:   PROM L PROM R AROM L AROM R MMT L MMT R   Flex P at end range, apprehensive to allow therapist to move shoulder much today  WFL  4 P    Abd   145 P   3 + P    Full Can     3 + P    Empty Can         IR   WFL  4    ER   WFL hard end feel  4    Ext/IR             Palpation: severe TTP at L UT, levators, cervical paraspinals, suboccipitals - P with axial distraction and suboccipital release  + ULNTT on LUE for median and radial    Special tests:   L R   Impingement     Neer's +    Hawkin's-Mike +    Coracoid Impingement     Internal impingement     Labral     Anterior Slide     Hendry's     Crank     Instability     Apprehension (anterior)     Relocation (anterior)     Anterior Load & Shift     Posterior Load & Shift     Posterior instability (with 90  degrees flex)     Multi-Directional Instability      Sulcus     Biceps      Speed's -    Rotator Cuff Tear     Drop Arm -    Belly Press     Lift off        Assessment/Plan:    Patient is a 47 year old female with left side upper back and neck complaints.    Patient has the following significant findings with corresponding treatment plan.                Diagnosis 1:  Cervical radiculopathy, myofacial pain  Pain -  hot/cold therapy, electric stimulation, manual therapy and splint/taping/bracing/orthotics  Decreased ROM/flexibility - manual therapy and therapeutic exercise  Decreased joint mobility - manual therapy and therapeutic exercise  Decreased strength - therapeutic exercise and therapeutic activities    Therapy Evaluation Codes:   Cumulative Therapy Evaluation is: Low complexity.    Previous and current functional limitations:  (See Goal Flow Sheet for this information)    Short term and Long term goals: (See Goal Flow Sheet for this information)     Communication ability:  Patient appears to be able to clearly communicate and understand verbal and written communication and follow directions correctly.  Treatment Explanation - The following has been discussed with the patient:   RX ordered/plan of care  Anticipated outcomes  Possible risks and side effects  This patient would benefit from PT intervention to resume normal activities.   Rehab potential is good.    Frequency:  1 X week, once daily  Duration:  for 4 weeks tapering to 2 X a month over 6 weeks  Discharge Plan:  Achieve all LTG.  Independent in home treatment program.  Reach maximal therapeutic benefit.    Please refer to the daily flowsheet for treatment today, total treatment time and time spent performing 1:1 timed codes.     Rona العلي, PT

## 2022-10-01 ENCOUNTER — HEALTH MAINTENANCE LETTER (OUTPATIENT)
Age: 47
End: 2022-10-01

## 2022-10-15 ENCOUNTER — THERAPY VISIT (OUTPATIENT)
Dept: PHYSICAL THERAPY | Facility: CLINIC | Age: 47
End: 2022-10-15
Payer: COMMERCIAL

## 2022-10-15 DIAGNOSIS — G89.29 CHRONIC LEFT SHOULDER PAIN: Primary | ICD-10-CM

## 2022-10-15 DIAGNOSIS — M25.512 CHRONIC LEFT SHOULDER PAIN: Primary | ICD-10-CM

## 2022-10-15 PROCEDURE — 97110 THERAPEUTIC EXERCISES: CPT | Mod: GP | Performed by: PHYSICAL THERAPIST

## 2022-10-15 PROCEDURE — 97140 MANUAL THERAPY 1/> REGIONS: CPT | Mod: GP | Performed by: PHYSICAL THERAPIST

## 2022-10-25 ENCOUNTER — OFFICE VISIT (OUTPATIENT)
Dept: FAMILY MEDICINE | Facility: CLINIC | Age: 47
End: 2022-10-25
Payer: COMMERCIAL

## 2022-10-25 VITALS
HEART RATE: 57 BPM | TEMPERATURE: 97.8 F | SYSTOLIC BLOOD PRESSURE: 117 MMHG | RESPIRATION RATE: 16 BRPM | WEIGHT: 201 LBS | OXYGEN SATURATION: 99 % | DIASTOLIC BLOOD PRESSURE: 76 MMHG | BODY MASS INDEX: 31.96 KG/M2

## 2022-10-25 DIAGNOSIS — R10.31 ABDOMINAL PAIN, RIGHT LOWER QUADRANT: Primary | ICD-10-CM

## 2022-10-25 DIAGNOSIS — S39.012A STRAIN OF MUSCLE, FASCIA AND TENDON OF LOWER BACK, INITIAL ENCOUNTER: ICD-10-CM

## 2022-10-25 LAB
ALBUMIN UR-MCNC: NEGATIVE MG/DL
APPEARANCE UR: CLEAR
BASOPHILS # BLD AUTO: 0 10E3/UL (ref 0–0.2)
BASOPHILS NFR BLD AUTO: 0 %
BILIRUB UR QL STRIP: NEGATIVE
COLOR UR AUTO: YELLOW
EOSINOPHIL # BLD AUTO: 0.1 10E3/UL (ref 0–0.7)
EOSINOPHIL NFR BLD AUTO: 1 %
ERYTHROCYTE [DISTWIDTH] IN BLOOD BY AUTOMATED COUNT: 12.9 % (ref 10–15)
GLUCOSE UR STRIP-MCNC: NEGATIVE MG/DL
HCT VFR BLD AUTO: 45.4 % (ref 35–47)
HGB BLD-MCNC: 14.8 G/DL (ref 11.7–15.7)
HGB UR QL STRIP: NEGATIVE
IMM GRANULOCYTES # BLD: 0 10E3/UL
IMM GRANULOCYTES NFR BLD: 0 %
KETONES UR STRIP-MCNC: ABNORMAL MG/DL
LEUKOCYTE ESTERASE UR QL STRIP: NEGATIVE
LYMPHOCYTES # BLD AUTO: 3.4 10E3/UL (ref 0.8–5.3)
LYMPHOCYTES NFR BLD AUTO: 41 %
MCH RBC QN AUTO: 30.3 PG (ref 26.5–33)
MCHC RBC AUTO-ENTMCNC: 32.6 G/DL (ref 31.5–36.5)
MCV RBC AUTO: 93 FL (ref 78–100)
MONOCYTES # BLD AUTO: 0.7 10E3/UL (ref 0–1.3)
MONOCYTES NFR BLD AUTO: 8 %
NEUTROPHILS # BLD AUTO: 4.2 10E3/UL (ref 1.6–8.3)
NEUTROPHILS NFR BLD AUTO: 50 %
NITRATE UR QL: NEGATIVE
PH UR STRIP: 5.5 [PH] (ref 5–8)
PLATELET # BLD AUTO: 236 10E3/UL (ref 150–450)
RBC # BLD AUTO: 4.88 10E6/UL (ref 3.8–5.2)
SP GR UR STRIP: >=1.03 (ref 1–1.03)
UROBILINOGEN UR STRIP-ACNC: 0.2 E.U./DL
WBC # BLD AUTO: 8.3 10E3/UL (ref 4–11)

## 2022-10-25 PROCEDURE — 85025 COMPLETE CBC W/AUTO DIFF WBC: CPT | Performed by: PHYSICIAN ASSISTANT

## 2022-10-25 PROCEDURE — 36415 COLL VENOUS BLD VENIPUNCTURE: CPT | Performed by: PHYSICIAN ASSISTANT

## 2022-10-25 PROCEDURE — 99214 OFFICE O/P EST MOD 30 MIN: CPT | Performed by: PHYSICIAN ASSISTANT

## 2022-10-25 PROCEDURE — 81003 URINALYSIS AUTO W/O SCOPE: CPT | Performed by: PHYSICIAN ASSISTANT

## 2022-10-25 RX ORDER — MEDROXYPROGESTERONE ACETATE 150 MG/ML
1 INJECTION, SUSPENSION INTRAMUSCULAR
COMMUNITY
End: 2024-07-19

## 2022-10-25 NOTE — PROGRESS NOTES
Assessment & Plan:      Problem List Items Addressed This Visit    None  Visit Diagnoses     Abdominal pain, right lower quadrant    -  Primary    Relevant Orders    UA macro with reflex to Microscopic and Culture - Clinc Collect (Completed)    CBC with platelets and differential (Completed)    Strain of muscle, fascia and tendon of lower back, initial encounter            Medical Decision Making  Patient presents with left lower back pains and right lower abdominal pains.  Left lower back pain appears consistent with a muscle strain.  Initial concerns for kidney stones, however urinalysis is negative for UTI and hematuria.  Right lower abdominal pains initially sounded concerning for possible appendicitis, however patient did not have significant tenderness on abdominal exam.  Patient further had a normal white blood cell count on CBC to rule out worse infection.  Suspect likely mild to moderate constipation.  Recommend trial of MiraLAX.  Continue to drink plenty of clear fluids and increase fiber in diet.  Recommend patient continue with warm compresses, trial of muscle relaxers, and over-the-counter analgesics for the left lower back pains.  Allergies and medication interactions reviewed.  Discussed signs of worsening symptoms and when to follow-up with PCP if no symptom improvement.     Subjective:      Lindsey Gavin is a 47 year old female here for evaluation of left lower back pains and right abdominal pains.  The low back began to hurt about 3 days ago.  No known injury or trauma.  Patient has history of pains on the left side of the body and is currently doing physical therapy.  She recently had to cancel her last physical therapy session due to the severe pains of the left lower back.  Pains are worse with movement such as going from sitting to standing, palpation of the left lower back, and keeps patient awake at nighttime.  She is already been using Aleve and a muscle relaxer with mild  temporary relief of symptoms.  Patient then also notes milder right lower abdominal pains for 1 week.  The symptoms come and go and are associated with bloating sensation.  Patient also notes reduction and bowel movements for the last week.  Patient has no history of being treated for constipation.  No previous abdominal surgeries.    The following portions of the patient's history were reviewed and updated as appropriate: allergies, current medications, and problem list.     Review of Systems  Pertinent items are noted in HPI.    Allergies  No Known Allergies    Family History   Problem Relation Age of Onset     Cerebrovascular Disease Mother      Hypertension Mother      Other - See Comments Father         influenza like illness     No Known Problems Sister      No Known Problems Brother      No Known Problems Sister      No Known Problems Sister      No Known Problems Sister      No Known Problems Sister      No Known Problems Sister      No Known Problems Brother      Breast Cancer No family hx of      Skin Cancer No family hx of      Colon Cancer No family hx of        Social History     Tobacco Use     Smoking status: Never     Smokeless tobacco: Never   Substance Use Topics     Alcohol use: No     Alcohol/week: 0.0 - 1.0 standard drinks        Objective:      /76 (BP Location: Right arm, Patient Position: Sitting, Cuff Size: Adult Large)   Pulse 57   Temp 97.8  F (36.6  C) (Oral)   Resp 16   Wt 91.2 kg (201 lb)   LMP 10/04/2022 (Approximate)   SpO2 99%   BMI 31.96 kg/m    General appearance - alert, well appearing, and in no distress and non-toxic  Abdomen - Mild tenderness to palpation of the right lower quadrant, no rebound tenderness or guarding, abdomen soft, normal bowel sounds, no masses or organomegaly  Back exam - Tenderness to palpation of the left lower back, no significant midline tenderness to the lumbosacral spine     Lab & Imaging Results    Results for orders placed or performed in  visit on 10/25/22   UA macro with reflex to Microscopic and Culture - Clinc Collect     Status: Abnormal    Specimen: Urine, Clean Catch   Result Value Ref Range    Color Urine Yellow Colorless, Straw, Light Yellow, Yellow    Appearance Urine Clear Clear    Glucose Urine Negative Negative, 1000 , >=2000 mg/dL    Bilirubin Urine Negative Negative    Ketones Urine Trace (A) Negative, 160  mg/dL    Specific Gravity Urine >=1.030 1.005 - 1.030    Blood Urine Negative Negative    pH Urine 5.5 5.0 - 8.0    Protein Albumin Urine Negative Negative, 300 , >=2000 mg/dL    Urobilinogen Urine 0.2 0.2, 1.0 E.U./dL    Nitrite Urine Negative Negative    Leukocyte Esterase Urine Negative Negative    Narrative    Microscopic not indicated   CBC with platelets and differential     Status: None   Result Value Ref Range    WBC Count 8.3 4.0 - 11.0 10e3/uL    RBC Count 4.88 3.80 - 5.20 10e6/uL    Hemoglobin 14.8 11.7 - 15.7 g/dL    Hematocrit 45.4 35.0 - 47.0 %    MCV 93 78 - 100 fL    MCH 30.3 26.5 - 33.0 pg    MCHC 32.6 31.5 - 36.5 g/dL    RDW 12.9 10.0 - 15.0 %    Platelet Count 236 150 - 450 10e3/uL    % Neutrophils 50 %    % Lymphocytes 41 %    % Monocytes 8 %    % Eosinophils 1 %    % Basophils 0 %    % Immature Granulocytes 0 %    Absolute Neutrophils 4.2 1.6 - 8.3 10e3/uL    Absolute Lymphocytes 3.4 0.8 - 5.3 10e3/uL    Absolute Monocytes 0.7 0.0 - 1.3 10e3/uL    Absolute Eosinophils 0.1 0.0 - 0.7 10e3/uL    Absolute Basophils 0.0 0.0 - 0.2 10e3/uL    Absolute Immature Granulocytes 0.0 <=0.4 10e3/uL   CBC with platelets and differential     Status: None    Narrative    The following orders were created for panel order CBC with platelets and differential.  Procedure                               Abnormality         Status                     ---------                               -----------         ------                     CBC with platelets and d...[[id number]]                      Final result                 Please view  results for these tests on the individual orders.       I personally reviewed these results and discussed findings with the patient.    The use of Dragon/WordSentry dictation services was used to construct the content of this note; any grammatical errors are non-intentional. Please contact the author directly if you are in need of any clarification.

## 2022-12-28 ENCOUNTER — ANCILLARY PROCEDURE (OUTPATIENT)
Dept: GENERAL RADIOLOGY | Facility: CLINIC | Age: 47
End: 2022-12-28
Attending: STUDENT IN AN ORGANIZED HEALTH CARE EDUCATION/TRAINING PROGRAM
Payer: COMMERCIAL

## 2022-12-28 ENCOUNTER — OFFICE VISIT (OUTPATIENT)
Dept: FAMILY MEDICINE | Facility: CLINIC | Age: 47
End: 2022-12-28
Payer: COMMERCIAL

## 2022-12-28 VITALS
HEART RATE: 71 BPM | SYSTOLIC BLOOD PRESSURE: 120 MMHG | RESPIRATION RATE: 16 BRPM | TEMPERATURE: 98.1 F | DIASTOLIC BLOOD PRESSURE: 81 MMHG | OXYGEN SATURATION: 99 %
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
## 2022-12-28 DIAGNOSIS — M25.531 RIGHT WRIST PAIN: ICD-10-CM

## 2022-12-28 DIAGNOSIS — M79.644 PAIN OF FINGER OF RIGHT HAND: ICD-10-CM

## 2022-12-28 DIAGNOSIS — M25.511 ACUTE PAIN OF RIGHT SHOULDER: ICD-10-CM

## 2022-12-28 DIAGNOSIS — M25.562 ACUTE PAIN OF LEFT KNEE: ICD-10-CM

## 2022-12-28 DIAGNOSIS — M25.531 RIGHT WRIST PAIN: Primary | ICD-10-CM

## 2022-12-28 PROCEDURE — 73110 X-RAY EXAM OF WRIST: CPT | Mod: TC | Performed by: RADIOLOGY

## 2022-12-28 PROCEDURE — 73562 X-RAY EXAM OF KNEE 3: CPT | Mod: TC | Performed by: RADIOLOGY

## 2022-12-28 PROCEDURE — 73030 X-RAY EXAM OF SHOULDER: CPT | Mod: TC | Performed by: RADIOLOGY

## 2022-12-28 PROCEDURE — 99213 OFFICE O/P EST LOW 20 MIN: CPT | Performed by: STUDENT IN AN ORGANIZED HEALTH CARE EDUCATION/TRAINING PROGRAM

## 2022-12-28 PROCEDURE — 73130 X-RAY EXAM OF HAND: CPT | Mod: TC | Performed by: RADIOLOGY

## 2022-12-28 NOTE — PATIENT INSTRUCTIONS
Patient was educated on the natural course of injury.  A radiologist will read your x-ray.  If there is a discrepancy in the interpretation of the x-ray you will be notified. Conservative measures discussed including rest, ice, compression, elevation, and over-the-counter analgesics (Tylenol or Ibuprofen) as needed. See your primary care provider if symptoms worsen or do not improve in 7 days. Seek emergency care if you develop severe pain/swelling, inability to move extremity, skin paleness, or weakness.

## 2022-12-28 NOTE — LETTER
[facility]  [address] [facility] [address] [address]  Phone: [phone]  Fax: [phone]    December 28, 2022        Lindsey Gavin  [address] [place] [id number]    To whom it may concern:    RE: Lindsey Gavin    Patient may return to work on 1/4/23 with the following:  No restrictions.    Please contact me for questions or concerns.      Sincerely,        Fallon Hernández MD

## 2022-12-28 NOTE — PROGRESS NOTES
URGENT CARE VISIT:    SUBJECTIVE:   Chief Complaint   Patient presents with     Fall     Fell and injured right hand and right wrist and check left  side of face       Juanraul Gavin is a 47 year old female who presents with a chief complaint of right wrist and hand pain.  Yesterday at 3 pm, she had a mechanical fall, slipped and fell forward onto her abdomen and hit a door, sustained a laceration on her left cheek, as well as her right wrist and left knee. She denies LOC.  Symptoms began <24 hours hour(s) ago, are mild and sudden onset  Context:  Injury:Yes.  Injury happened while at home. Mechanism: as above.   She presents today due worsening right wrist/hand pain.  Pain exacerbated by movement Relieved by rest and tylenol/iburprofen.  She treated it initially with ice, Tylenol and Ibuprofen. This is the first time this type of injury has occurred to this patient.   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  She applied vaseline to the superficial laceration on her left cheek.    PMH:   Past Medical History:   Diagnosis Date     Tinnitus, bilateral 10/9/2018     Allergies: Patient has no known allergies.   Medications:   Current Outpatient Medications   Medication Sig Dispense Refill     diclofenac (VOLTAREN) 1 % topical gel Apply 2 g topically 3 times daily as needed for moderate pain (4-6) 50 g 0     medroxyPROGESTERone (DEPO-PROVERA) 150 MG/ML IM injection 1 mL       Multiple Vitamin (MULTIVITAMIN ADULT PO) Multi Vitamin       cyclobenzaprine (FLEXERIL) 5 MG tablet Take 1 tablet (5 mg) by mouth 3 times daily as needed for muscle spasms (Patient not taking: Reported on 12/28/2022) 30 tablet 0     SLYND 4 MG TABS Take 1 tablet by mouth daily (Patient not taking: Reported on 12/28/2022)       Social History:   Social History     Tobacco Use     Smoking status: Never     Smokeless tobacco: Never   Substance Use Topics     Alcohol use: No     Alcohol/week: 0.0 - 1.0 standard drinks        ROS:  Review of systems negative except as stated above.    OBJECTIVE:  /81   Pulse 71   Temp 98.1  F (36.7  C) (Oral)   Resp 16   SpO2 99%   GENERAL APPEARANCE: healthy, alert and no distress  HEENT: 1 cm superficial laceration on left cheek, nonbleeding  MUSCULOSKELETAL:Examination of the right shoulder reveals tenderness to the anterior aspect, limited ROM due to pain and contralateral shoulder exam is normal, Exam of the right wrist/hand reveals, soft tissue tenderness and swelling at the site of injury as well as third MCP, reduced range of motion noted, radial pulse normal and sensation normal  EXTREMITIES: peripheral pulses normal  SKIN: no skin discoloration  NEURO: alert, oriented x4, sensation intact, able to ambulate    IMAGING:  X-RAY was done.  X-ray ordered and interpreted in the office independently by me.     XR left knee  IMPRESSION: Normal joint spaces and alignment. No fracture or joint effusion.    XR right shoulder  IMPRESSION: Normal joint spaces and alignment. No fracture.    XR right wrist  IMPRESSION: No fracture. Normal alignment. No degenerative changes.    XR right hand  IMPRESSION: No fracture. Normal alignment. No degenerative changes.    ASSESSMENT:    ICD-10-CM    1. Right wrist pain  M25.531 XR Hand Right G/E 3 Views     Wrist/Arm/Hand Supplies Order for DME - ONLY FOR DME     CANCELED: XR Hand Right G/E 3 Views      2. Pain of finger of right hand  M79.644 XR Wrist Right G/E 3 Views     CANCELED: XR Wrist Right G/E 3 Views      3. Fall, initial encounter  W19.XXXA       4. Acute pain of right shoulder  M25.511 XR Shoulder Right G/E 3 Views     diclofenac (VOLTAREN) 1 % topical gel     CANCELED: XR Shoulder Right G/E 3 Views      5. Acute pain of left knee  M25.562 XR Knee Left 3 Views     diclofenac (VOLTAREN) 1 % topical gel     CANCELED: XR Knee Left 3 Views      Likely musculoskeletal sprain of right wrist, right shoulder, left knee after mechanical fall.      PLAN:  Patient was educated on the natural course of injury.  A radiologist will read your x-ray.  If there is a discrepancy in the interpretation of the x-ray you will be notified. Conservative measures discussed including rest, ice, compression, elevation, and over-the-counter analgesics (Tylenol or Ibuprofen) as needed, stretching. See your primary care provider if symptoms worsen or do not improve in 7 days. Seek emergency care if you develop severe pain/swelling, inability to move extremity, skin paleness, or weakness.     Fallon Hernández MD on 12/28/2022 at 2:03 PM

## 2023-02-05 ENCOUNTER — HEALTH MAINTENANCE LETTER (OUTPATIENT)
Age: 48
End: 2023-02-05

## 2023-04-20 ENCOUNTER — OFFICE VISIT (OUTPATIENT)
Dept: FAMILY MEDICINE | Facility: CLINIC | Age: 48
End: 2023-04-20
Payer: COMMERCIAL

## 2023-04-20 ENCOUNTER — TELEPHONE (OUTPATIENT)
Dept: FAMILY MEDICINE | Facility: CLINIC | Age: 48
End: 2023-04-20

## 2023-04-20 VITALS
WEIGHT: 206.2 LBS | SYSTOLIC BLOOD PRESSURE: 98 MMHG | HEART RATE: 70 BPM | OXYGEN SATURATION: 98 % | BODY MASS INDEX: 32.36 KG/M2 | DIASTOLIC BLOOD PRESSURE: 60 MMHG | HEIGHT: 67 IN | RESPIRATION RATE: 18 BRPM

## 2023-04-20 DIAGNOSIS — Z78.9 HEPATITIS B IMMUNE: ICD-10-CM

## 2023-04-20 DIAGNOSIS — N63.13 MASS OF LOWER OUTER QUADRANT OF RIGHT BREAST: Primary | ICD-10-CM

## 2023-04-20 DIAGNOSIS — N61.1 BREAST ABSCESS: Primary | ICD-10-CM

## 2023-04-20 LAB
ERYTHROCYTE [DISTWIDTH] IN BLOOD BY AUTOMATED COUNT: 12.6 % (ref 10–15)
HCT VFR BLD AUTO: 45.5 % (ref 35–47)
HGB BLD-MCNC: 14.9 G/DL (ref 11.7–15.7)
MCH RBC QN AUTO: 30.2 PG (ref 26.5–33)
MCHC RBC AUTO-ENTMCNC: 32.7 G/DL (ref 31.5–36.5)
MCV RBC AUTO: 92 FL (ref 78–100)
PLATELET # BLD AUTO: 416 10E3/UL (ref 150–450)
RBC # BLD AUTO: 4.93 10E6/UL (ref 3.8–5.2)
WBC # BLD AUTO: 6.5 10E3/UL (ref 4–11)

## 2023-04-20 PROCEDURE — 85027 COMPLETE CBC AUTOMATED: CPT | Performed by: NURSE PRACTITIONER

## 2023-04-20 PROCEDURE — 99213 OFFICE O/P EST LOW 20 MIN: CPT | Performed by: NURSE PRACTITIONER

## 2023-04-20 PROCEDURE — 36415 COLL VENOUS BLD VENIPUNCTURE: CPT | Performed by: NURSE PRACTITIONER

## 2023-04-20 RX ORDER — CEPHALEXIN 500 MG/1
500 CAPSULE ORAL 4 TIMES DAILY
Qty: 40 CAPSULE | Refills: 0 | Status: SHIPPED | OUTPATIENT
Start: 2023-04-20 | End: 2023-04-30

## 2023-04-20 NOTE — TELEPHONE ENCOUNTER
1. Breast abscess    - cephALEXin (KEFLEX) 500 MG capsule; Take 1 capsule (500 mg) by mouth 4 times daily for 10 days  Dispense: 40 capsule; Refill: 0    Discussed with patient that felt confident that this represented abscess and would need to consult with breast surgeon. Will treat empirically until able to get in. Referral has been placed for this already. Do not need to do imaging at this point due to concern for abscess/infection. Will defer imaging to breast specialist if needed     Patient agreeable and understands plan.

## 2023-04-20 NOTE — PROGRESS NOTES
"  Assessment & Plan         Mass of lower outer quadrant of right breast  Normal CBC. No signs of systemic infection    Concern mass represents abscess vs tumor. Referral placed to general surgery breast specialist for patient. Likely will need drainage. Will defer imaging at this time as seems to represent more infectious origin versus other etiology    Will treat empirically with cephalexin 500 mg 4 times a day for 10 days.    - CBC with platelets; Future  - Adult General Surg Referral; Future  - CBC with platelets    Hepatitis B Immune  Patient had titers drawn showing immunity to Hep B-does not need vaccine.   Titers on file from [facility] from 7/22/22             BMI:   Estimated body mass index is 32.78 kg/m  as calculated from the following:    Height as of this encounter: 1.689 m (5' 6.5\").    Weight as of this encounter: 93.5 kg (206 lb 3.2 oz).         AMELIA HAWKINS [facility]    Lisa Portillo is a 47 year old, presenting for the following health issues:  Breast Problem  Pain started in February in right breast. Pain would come and go. Last night when sitting was very uncomfortable and when touched yesterday. No reddness/warm to touch.     No personal history of breast cancer or family history of breast cancer      4/20/2023    11:18 AM   Additional Questions   Roomed by BELKYS MARTINES     History of Present Illness       Reason for visit:  I have a painful bump under my right breast  Symptom onset:  1-3 days ago  Symptoms include:  Pain on right breast go through my right shoulder  Symptom intensity:  Moderate  Symptom progression:  Staying the same  Had these symptoms before:  No  What makes it worse:  Moving around  What makes it better:  Resting    She eats 0-1 servings of fruits and vegetables daily.She consumes 0 sweetened beverage(s) daily.She exercises with enough effort to increase her heart rate 9 or less minutes per day.  She exercises with " "enough effort to increase her heart rate 3 or less days per week.   She is taking medications regularly.   Patient reports history of mammogram in 2020 and was normal.            Review of Systems         Objective    BP 98/60   Pulse 70   Resp 18   Ht 1.689 m (5' 6.5\")   Wt 93.5 kg (206 lb 3.2 oz)   SpO2 98%   BMI 32.78 kg/m    Body mass index is 32.78 kg/m .  Physical Exam  Constitutional:       Appearance: Normal appearance.   Chest:   Breasts:     Right: Mass present.       Lymphadenopathy:      Upper Body:      Right upper body: No axillary adenopathy.      Left upper body: No axillary adenopathy.   Neurological:      General: No focal deficit present.      Mental Status: She is alert and oriented to person, place, and time.   Psychiatric:         Mood and Affect: Mood normal.         Behavior: Behavior normal.            Results for orders placed or performed in visit on 04/20/23 (from the past 24 hour(s))   CBC with platelets   Result Value Ref Range    WBC Count 6.5 4.0 - 11.0 10e3/uL    RBC Count 4.93 3.80 - 5.20 10e6/uL    Hemoglobin 14.9 11.7 - 15.7 g/dL    Hematocrit 45.5 35.0 - 47.0 %    MCV 92 78 - 100 fL    MCH 30.2 26.5 - 33.0 pg    MCHC 32.7 31.5 - 36.5 g/dL    RDW 12.6 10.0 - 15.0 %    Platelet Count 416 150 - 450 10e3/uL                   "

## 2023-04-26 ENCOUNTER — OFFICE VISIT (OUTPATIENT)
Dept: SURGERY | Facility: CLINIC | Age: 48
End: 2023-04-26
Attending: SURGERY
Payer: COMMERCIAL

## 2023-04-26 VITALS — WEIGHT: 206 LBS | HEIGHT: 67 IN | RESPIRATION RATE: 16 BRPM | BODY MASS INDEX: 32.33 KG/M2

## 2023-04-26 DIAGNOSIS — N63.13 MASS OF LOWER OUTER QUADRANT OF RIGHT BREAST: ICD-10-CM

## 2023-04-26 PROCEDURE — 99213 OFFICE O/P EST LOW 20 MIN: CPT | Performed by: SURGERY

## 2023-04-26 PROCEDURE — 99243 OFF/OP CNSLTJ NEW/EST LOW 30: CPT | Performed by: SURGERY

## 2023-04-26 RX ORDER — CYCLOBENZAPRINE HCL 5 MG
TABLET ORAL
COMMUNITY
End: 2024-07-19

## 2023-04-26 NOTE — NURSING NOTE
"Lindsey   presents to [facility] today for a surgical consult with Dr. Celeste  regarding a sebaceous cyst under  Her right breast .  RN assessment and EMR update. Resp 16   Ht 1.689 m (5' 6.5\")   Wt 93.4 kg (206 lb)   BMI 32.75 kg/m    Patient met with Dr. Celeste .  See dictation for details of visit and follow up plan.  Patient given RN/MD phone numbers.  RN time 15 mins.  "

## 2023-04-26 NOTE — LETTER
2023         RE: Lindsey Gavin  [address] [place] [id number]        Dear Colleague,    Thank you for referring your patient, Lindsey Gavin, to the [facility]. Please see a copy of my visit note below.    History:  This is a 47 year old female who I'm asked to see by AMELIA Paulino CNP for evaluation of a right breast lump.  The patient states that she developed pain in her lower inner breast about 2 months ago.  It really localized about 2 weeks ago.  She then noticed a lump.  She describes the pain as burning with radiation to her shoulder.  the lump has a bit of redness over it.  it has never drained.  she has had a lump like this on her neck.  that one will come and go.  she was seen by her pcp and started on keflex.  since she started the antibiotic it is about the same size but is less painful.     Allergies:  No Known Allergies    Past medical history:  Obesity    Past surgical history:   x3    Medications:     cephALEXin (KEFLEX) 500 MG capsule, Take 1 capsule (500 mg) by mouth 4 times daily for 10 days, Disp: 40 capsule, Rfl: 0     cyclobenzaprine (FLEXERIL) 5 MG tablet, , Disp: , Rfl:      medroxyPROGESTERone (DEPO-PROVERA) 150 MG/ML IM injection, 1 mL, Disp: , Rfl:      Multiple Vitamin (MULTIVITAMIN ADULT PO), Multi Vitamin, Disp: , Rfl:     Family history:  She has no family history of breast cancer.    Social history:  Consumes alcohol occasionally.  Denies tobacco and illicit drug use    Review of Systems:  General: No complaints or constitutional symptoms  Skin: No complaints or symptoms   Hematologic/Lymphatic: No symptoms or complaints  Psychiatric: No symptoms or complaints  Endocrine: No excessive fatigue, no hypermetabolic symptoms reported  Respiratory: No cough, shortness of breath, or wheezing  Cardiovascular: No chest pain or dyspnea on exertion  Breast: Painful lump right lower inner  "breast  Gastrointestinal: No abdominal pain, nausea, diarrhea, or constipation  Musculoskeletal: No recent injuries reported  Neurological: No focal neurologic defects reported.      Physical Exam:  Resp 16   Ht 1.689 m (5' 6.5\")   Wt 93.4 kg (206 lb)   BMI 32.75 kg/m    General: Alert, cooperative, appears stated age   Skin: Skin color, texture, turgor normal, no rashes or lesions   Lymphatic: No obvious adenopathy, no swelling   Eyes: No scleral icterus, pupils equal  HENT: No traumatic injury to the head or face, no gross abnormalities  Lungs: Normal respiratory effort  Heart: Regular rate  Breasts: Just superior to the inframammary fold in the far lower inner quadrant of the right breast is a superficial well-defined swelling with erythema directly over it.  The mass is just over 1 cm in size and well-defined.  Findings are consistent with an infected sebaceous cyst  Abdomen: Soft, non-distended and non-tender to palpation  Neurologic: Grossly intact    Imaging:  None since 2020    Pathology:  None    IMPRESSION:  Infected sebaceous cyst on the right lower inner breast    PLAN:   Discussed treatment for infected sebaceous cysts.  She has already been started on antibiotics and is having improvement.  To facilitate improvement we could consider incision and drainage.  She understands that I&D would help to recover from the infection faster, but would ultimately not take care of the cyst.  In order to remove the cyst, we would want the entirety of the inflammation to resolve.  I like to give the area about 2 months.  If she wanted the cyst removed we could do it in the office under local anesthetic.  We would perform this in the general surgery clinic.  I explained postprocedural recovery following cyst excision.    For now she would like to continue taking the antibiotic.  She will give us a call if it worsens and wants an I&D.  If she is interested in having the cyst excised, she will give my office a call " back and we could schedule a 30-minute procedure appointment in the general surgery clinic.    Mindy Celeste DO  General Surgeon  [address] [address]  Office: [phone]  Employed by - [facility]        Again, thank you for allowing me to participate in the care of your patient.        Sincerely,        Mindy Celeste DO

## 2023-04-26 NOTE — PROGRESS NOTES
"History:  This is a 47 year old female who I'm asked to see by AMELIA Paulino CNP for evaluation of a right breast lump.  The patient states that she developed pain in her lower inner breast about 2 months ago.  It really localized about 2 weeks ago.  She then noticed a lump.  She describes the pain as burning with radiation to her shoulder.  the lump has a bit of redness over it.  it has never drained.  she has had a lump like this on her neck.  that one will come and go.  she was seen by her pcp and started on keflex.  since she started the antibiotic it is about the same size but is less painful.     Allergies:  No Known Allergies    Past medical history:  Obesity    Past surgical history:   x3    Medications:     cephALEXin (KEFLEX) 500 MG capsule, Take 1 capsule (500 mg) by mouth 4 times daily for 10 days, Disp: 40 capsule, Rfl: 0     cyclobenzaprine (FLEXERIL) 5 MG tablet, , Disp: , Rfl:      medroxyPROGESTERone (DEPO-PROVERA) 150 MG/ML IM injection, 1 mL, Disp: , Rfl:      Multiple Vitamin (MULTIVITAMIN ADULT PO), Multi Vitamin, Disp: , Rfl:     Family history:  She has no family history of breast cancer.    Social history:  Consumes alcohol occasionally.  Denies tobacco and illicit drug use    Review of Systems:  General: No complaints or constitutional symptoms  Skin: No complaints or symptoms   Hematologic/Lymphatic: No symptoms or complaints  Psychiatric: No symptoms or complaints  Endocrine: No excessive fatigue, no hypermetabolic symptoms reported  Respiratory: No cough, shortness of breath, or wheezing  Cardiovascular: No chest pain or dyspnea on exertion  Breast: Painful lump right lower inner breast  Gastrointestinal: No abdominal pain, nausea, diarrhea, or constipation  Musculoskeletal: No recent injuries reported  Neurological: No focal neurologic defects reported.      Physical Exam:  Resp 16   Ht 1.689 m (5' 6.5\")   Wt 93.4 kg (206 lb)   BMI 32.75 kg/m    General: Alert, " cooperative, appears stated age   Skin: Skin color, texture, turgor normal, no rashes or lesions   Lymphatic: No obvious adenopathy, no swelling   Eyes: No scleral icterus, pupils equal  HENT: No traumatic injury to the head or face, no gross abnormalities  Lungs: Normal respiratory effort  Heart: Regular rate  Breasts: Just superior to the inframammary fold in the far lower inner quadrant of the right breast is a superficial well-defined swelling with erythema directly over it.  The mass is just over 1 cm in size and well-defined.  Findings are consistent with an infected sebaceous cyst  Abdomen: Soft, non-distended and non-tender to palpation  Neurologic: Grossly intact    Imaging:  None since 2020    Pathology:  None    IMPRESSION:  Infected sebaceous cyst on the right lower inner breast    PLAN:   Discussed treatment for infected sebaceous cysts.  She has already been started on antibiotics and is having improvement.  To facilitate improvement we could consider incision and drainage.  She understands that I&D would help to recover from the infection faster, but would ultimately not take care of the cyst.  In order to remove the cyst, we would want the entirety of the inflammation to resolve.  I like to give the area about 2 months.  If she wanted the cyst removed we could do it in the office under local anesthetic.  We would perform this in the general surgery clinic.  I explained postprocedural recovery following cyst excision.    For now she would like to continue taking the antibiotic.  She will give us a call if it worsens and wants an I&D.  If she is interested in having the cyst excised, she will give my office a call back and we could schedule a 30-minute procedure appointment in the general surgery clinic.    Mindy Celeste DO  General Surgeon  [address] [address]  Office: [phone]  Employed by - [facility]

## 2023-05-24 ENCOUNTER — LAB REQUISITION (OUTPATIENT)
Dept: LAB | Facility: CLINIC | Age: 48
End: 2023-05-24

## 2023-05-24 DIAGNOSIS — Z12.4 ENCOUNTER FOR SCREENING FOR MALIGNANT NEOPLASM OF CERVIX: ICD-10-CM

## 2023-05-24 PROCEDURE — G0145 SCR C/V CYTO,THINLAYER,RESCR: HCPCS | Performed by: OBSTETRICS & GYNECOLOGY

## 2023-05-28 LAB
BKR LAB AP GYN ADEQUACY: NORMAL
BKR LAB AP GYN INTERPRETATION: NORMAL
BKR LAB AP HPV REFLEX: NORMAL
BKR LAB AP LMP: NORMAL
BKR LAB AP PREVIOUS ABNL DX: NORMAL
BKR LAB AP PREVIOUS ABNORMAL: NORMAL
PATH REPORT.COMMENTS IMP SPEC: NORMAL
PATH REPORT.COMMENTS IMP SPEC: NORMAL
PATH REPORT.RELEVANT HX SPEC: NORMAL

## 2023-07-29 ENCOUNTER — OFFICE VISIT (OUTPATIENT)
Dept: FAMILY MEDICINE | Facility: CLINIC | Age: 48
End: 2023-07-29
Payer: COMMERCIAL

## 2023-07-29 VITALS
BODY MASS INDEX: 33.07 KG/M2 | SYSTOLIC BLOOD PRESSURE: 116 MMHG | DIASTOLIC BLOOD PRESSURE: 79 MMHG | WEIGHT: 208 LBS | OXYGEN SATURATION: 98 % | HEART RATE: 81 BPM | TEMPERATURE: 98.5 F | RESPIRATION RATE: 16 BRPM

## 2023-07-29 DIAGNOSIS — J02.0 STREP THROAT: Primary | ICD-10-CM

## 2023-07-29 LAB
DEPRECATED S PYO AG THROAT QL EIA: POSITIVE
SARS-COV-2 RNA RESP QL NAA+PROBE: NEGATIVE

## 2023-07-29 PROCEDURE — 87880 STREP A ASSAY W/OPTIC: CPT | Performed by: STUDENT IN AN ORGANIZED HEALTH CARE EDUCATION/TRAINING PROGRAM

## 2023-07-29 PROCEDURE — 87635 SARS-COV-2 COVID-19 AMP PRB: CPT | Performed by: STUDENT IN AN ORGANIZED HEALTH CARE EDUCATION/TRAINING PROGRAM

## 2023-07-29 PROCEDURE — 99203 OFFICE O/P NEW LOW 30 MIN: CPT | Performed by: STUDENT IN AN ORGANIZED HEALTH CARE EDUCATION/TRAINING PROGRAM

## 2023-07-29 RX ORDER — AMOXICILLIN 875 MG
875 TABLET ORAL 2 TIMES DAILY
Qty: 20 TABLET | Refills: 0 | Status: SHIPPED | OUTPATIENT
Start: 2023-07-29 | End: 2024-07-19

## 2023-07-29 NOTE — PROGRESS NOTES
"Assessment & Plan     Strep throat  Prior to strep test turn positive, recommended swabbing for COVID given that the patient was recently in the emergency room with a patient.  She excepted testing.  Given that her strep test is positive, recommended treatment at this time with amoxicillin.  - Streptococcus A Rapid Screen w/Reflex to PCR - Clinic Collect  - Symptomatic COVID-19 Virus (Coronavirus) by PCR Nasopharyngeal  - amoxicillin (AMOXIL) 875 MG tablet  Dispense: 20 tablet; Refill: 0    20 minutes spent by me on the date of the encounter doing chart review, history and exam, documentation and further activities per the note       BMI:   Estimated body mass index is 33.07 kg/m  as calculated from the following:    Height as of 4/26/23: 1.689 m (5' 6.5\").    Weight as of this encounter: 94.3 kg (208 lb).   Patient to follow up with PCP      Natalia Spears MD   [facility]    Subjective   Immaculeraul Gavin is a 47 year old who presents for the following health issues     HPI     Presents for 3-day history of sore throat, some congestion, headache.  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  Unclear of any other sick contacts however.  She has been using Tylenol as needed for headache, tea and honey for sore throat.  She feels as though symptoms are steadily getting worse.    Review of Systems   Complete ROS normal aside noted in HPI      Objective    /79   Pulse 81   Temp 98.5  F (36.9  C) (Oral)   Resp 16   Wt 94.3 kg (208 lb)   SpO2 98%   BMI 33.07 kg/m    Body mass index is 33.07 kg/m .    Physical Exam   GENERAL: healthy, alert and no distress  EYES: Eyes grossly normal to inspection, PERRL and conjunctivae and sclerae normal  HENT: ear canals and TM's normal, nose and mouth without ulcers or lesions  NECK: no adenopathy, no asymmetry, masses, or scars " and thyroid normal to palpation  RESP: lungs clear to auscultation - no rales, rhonchi or wheezes  CV: regular rate and rhythm, normal S1 S2, no S3 or S4, no murmur, click or rub, no peripheral edema and peripheral pulses strong  MS: no gross musculoskeletal defects noted, no edema    Results for orders placed or performed in visit on 07/29/23 (from the past 24 hour(s))   Streptococcus A Rapid Screen w/Reflex to PCR - Clinic Collect    Specimen: Throat; Swab   Result Value Ref Range    Group A Strep antigen Positive (A) Negative

## 2023-10-15 ENCOUNTER — HEALTH MAINTENANCE LETTER (OUTPATIENT)
Age: 48
End: 2023-10-15

## 2024-01-11 ENCOUNTER — HOSPITAL ENCOUNTER (OUTPATIENT)
Dept: MAMMOGRAPHY | Facility: CLINIC | Age: 49
Discharge: HOME | End: 2024-01-11
Attending: FAMILY MEDICINE | Admitting: FAMILY MEDICINE
Payer: COMMERCIAL

## 2024-01-11 DIAGNOSIS — Z12.31 ENCOUNTER FOR SCREENING MAMMOGRAM FOR BREAST CANCER: ICD-10-CM

## 2024-01-11 PROCEDURE — 77067 SCR MAMMO BI INCL CAD: CPT

## 2024-01-15 ENCOUNTER — HOSPITAL ENCOUNTER (OUTPATIENT)
Dept: MAMMOGRAPHY | Facility: CLINIC | Age: 49
Discharge: HOME | End: 2024-01-15
Attending: FAMILY MEDICINE
Payer: COMMERCIAL

## 2024-01-15 DIAGNOSIS — N64.89 BREAST ASYMMETRY: ICD-10-CM

## 2024-01-15 PROCEDURE — 76642 ULTRASOUND BREAST LIMITED: CPT | Mod: RT

## 2024-01-15 PROCEDURE — 77061 BREAST TOMOSYNTHESIS UNI: CPT | Mod: RT

## 2024-05-22 ENCOUNTER — OFFICE VISIT (OUTPATIENT)
Dept: FAMILY MEDICINE | Facility: CLINIC | Age: 49
End: 2024-05-22
Payer: COMMERCIAL

## 2024-05-22 VITALS
DIASTOLIC BLOOD PRESSURE: 78 MMHG | TEMPERATURE: 98.3 F | SYSTOLIC BLOOD PRESSURE: 112 MMHG | RESPIRATION RATE: 16 BRPM | HEART RATE: 55 BPM | OXYGEN SATURATION: 99 %

## 2024-05-22 DIAGNOSIS — H61.21 IMPACTED CERUMEN OF RIGHT EAR: ICD-10-CM

## 2024-05-22 DIAGNOSIS — L50.9 HIVES: Primary | ICD-10-CM

## 2024-05-22 PROCEDURE — 99214 OFFICE O/P EST MOD 30 MIN: CPT | Performed by: PHYSICIAN ASSISTANT

## 2024-05-22 RX ORDER — TRIAMCINOLONE ACETONIDE 1 MG/G
CREAM TOPICAL 2 TIMES DAILY
Qty: 30 G | Refills: 1 | Status: SHIPPED | OUTPATIENT
Start: 2024-05-22 | End: 2024-06-05

## 2024-05-22 RX ORDER — CETIRIZINE HYDROCHLORIDE 10 MG/1
10 TABLET ORAL DAILY
Qty: 30 TABLET | Refills: 0 | Status: SHIPPED | OUTPATIENT
Start: 2024-05-22 | End: 2024-06-21

## 2024-05-22 NOTE — PATIENT INSTRUCTIONS
You were seen today for hives, likely due to an allergic reaction. These symptoms are generally benign and will improve with conservative management. Keep a watch for worsening symptoms listed below.     Symptom management:  - May take 10mg of over the counter cetirizine (Zyrtec) or loratadine (Claritin) once a day while symptoms last  - Cold compresses for 10-15 minutes at a time, up to every hour as needed for swelling and itchiness  - Benadryl for severe itchiness and swelling (but this medication will make you feel drowsy)  - May also use over the counter hydrocortisone 1% ointment over itchy areas to suppress itching for up to 2 weeks ONLY    Reasons to be seen for immediately in the emergency room:  - Fever of 100.4  - Tongue or lips swelling  - Difficulty breathing or swallowing  - Feeling tightness in the throat or chest  - Develop abdominal pain    Otherwise, if no improvement in symptoms in 1 week, follow-up with the primary care provider

## 2024-05-22 NOTE — PROGRESS NOTES
"  Assessment & Plan:      Problem List Items Addressed This Visit    None  Visit Diagnoses       Hives    -  Primary    Relevant Medications    triamcinolone (KENALOG) 0.1 % external cream    cetirizine (ZYRTEC) 10 MG tablet    Impacted cerumen of right ear        Relevant Orders    Patient care order          Medical Decision Making  Patient presents for ration right ear discomfort.  Right ear is noted to have cerumen impaction.  Clinical assistant successfully irrigated without complication.  TMs do show some mild ear effusion but otherwise no signs of otitis media.  Symptoms of rash, eye irritation, and ear effusion appear consistent with seasonal allergies.  Recommend topical steroids for the itchy rash and over-the-counter antihistamines.  Discussed treatment and symptomatic care.  Allergies and medication interactions reviewed.  Discussed signs of worsening symptoms and when to follow-up with PCP if no symptom improvement.    30 minutes spent in total for charting, chart review, patient examination, and discussion with patient on labs, counseling, and coordination of care as listed above.     Subjective:      Lindsey Gavin is a 48 year old female here for evaluation of rash and right ear discomfort.  Patient noted plugged sensation of right ear with occasional on and off discomfort over the last 2 weeks.  Patient then noted a rash of the forearms bilaterally and along the lower back over the last 3 to 4 days.  She tried topical hydrocortisone and this made her skin \"stinging\".  Patient does have history of eczema.  She also notes eye dryness and stinging sensation over the last couple weeks.  No significant sneezing, cough, or sore throat.  No fevers.     The following portions of the patient's history were reviewed and updated as appropriate: allergies, current medications, and problem list.     Review of Systems  Pertinent items are noted in HPI.    Allergies  No Known Allergies    Family History "   Problem Relation Age of Onset    Cerebrovascular Disease Mother     Hypertension Mother     Other - See Comments Father         influenza like illness    No Known Problems Sister     No Known Problems Brother     No Known Problems Sister     No Known Problems Sister     No Known Problems Sister     No Known Problems Sister     No Known Problems Sister     No Known Problems Brother     Breast Cancer No family hx of     Skin Cancer No family hx of     Colon Cancer No family hx of        Social History     Tobacco Use    Smoking status: Never     Passive exposure: Never    Smokeless tobacco: Never   Substance Use Topics    Alcohol use: No     Alcohol/week: 0.0 - 1.0 standard drinks of alcohol        Objective:      /78   Pulse 55   Temp 98.3  F (36.8  C)   Resp 16   LMP 05/19/2024 (Approximate)   SpO2 99%   General appearance - alert, well appearing, and in no distress and non-toxic  Eyes - conjunctivae/sclera lightly injected bilaterally, no purulent discharge  Ears - right: Cerumen impaction.  Left: TM intact with mild mucoid fluid, no bulging or erythema  Nose - normal and patent, no erythema, discharge or polyps  Mouth - mucous membranes moist, pharynx normal without lesions  Neck - supple, no significant adenopathy  Skin - fine, lightly erythematous papular rash affecting the forearms and trunk    The use of Dragon/eBrisk Video dictation services was used to construct the content of this note; any grammatical errors are non-intentional. Please contact the author directly if you are in need of any clarification.

## 2024-07-19 ENCOUNTER — OFFICE VISIT (OUTPATIENT)
Dept: FAMILY MEDICINE | Facility: CLINIC | Age: 49
End: 2024-07-19
Payer: COMMERCIAL

## 2024-07-19 ENCOUNTER — ANCILLARY PROCEDURE (OUTPATIENT)
Dept: GENERAL RADIOLOGY | Facility: CLINIC | Age: 49
End: 2024-07-19
Attending: NURSE PRACTITIONER
Payer: COMMERCIAL

## 2024-07-19 VITALS
HEART RATE: 61 BPM | HEIGHT: 67 IN | OXYGEN SATURATION: 100 % | WEIGHT: 206.2 LBS | TEMPERATURE: 98.5 F | BODY MASS INDEX: 32.36 KG/M2 | SYSTOLIC BLOOD PRESSURE: 111 MMHG | RESPIRATION RATE: 18 BRPM | DIASTOLIC BLOOD PRESSURE: 64 MMHG

## 2024-07-19 DIAGNOSIS — G43.809 OTHER MIGRAINE WITHOUT STATUS MIGRAINOSUS, NOT INTRACTABLE: ICD-10-CM

## 2024-07-19 DIAGNOSIS — R22.40 MASS OF SHIN: ICD-10-CM

## 2024-07-19 DIAGNOSIS — M79.89 SOFT TISSUE CALCIFICATION: ICD-10-CM

## 2024-07-19 DIAGNOSIS — R25.2 LEG CRAMP: Primary | ICD-10-CM

## 2024-07-19 LAB
ANION GAP SERPL CALCULATED.3IONS-SCNC: 10 MMOL/L (ref 7–15)
BUN SERPL-MCNC: 5.7 MG/DL (ref 6–20)
CALCIUM SERPL-MCNC: 9.5 MG/DL (ref 8.8–10.4)
CHLORIDE SERPL-SCNC: 108 MMOL/L (ref 98–107)
CREAT SERPL-MCNC: 0.68 MG/DL (ref 0.51–0.95)
CRP SERPL-MCNC: <3 MG/L
EGFRCR SERPLBLD CKD-EPI 2021: >90 ML/MIN/1.73M2
ERYTHROCYTE [DISTWIDTH] IN BLOOD BY AUTOMATED COUNT: 13 % (ref 10–15)
GLUCOSE SERPL-MCNC: 100 MG/DL (ref 70–99)
HCO3 SERPL-SCNC: 22 MMOL/L (ref 22–29)
HCT VFR BLD AUTO: 44.3 % (ref 35–47)
HGB BLD-MCNC: 14.7 G/DL (ref 11.7–15.7)
MAGNESIUM SERPL-MCNC: 2.3 MG/DL (ref 1.7–2.3)
MCH RBC QN AUTO: 30.4 PG (ref 26.5–33)
MCHC RBC AUTO-ENTMCNC: 33.2 G/DL (ref 31.5–36.5)
MCV RBC AUTO: 92 FL (ref 78–100)
PLATELET # BLD AUTO: 428 10E3/UL (ref 150–450)
POTASSIUM SERPL-SCNC: 3.9 MMOL/L (ref 3.4–5.3)
RBC # BLD AUTO: 4.83 10E6/UL (ref 3.8–5.2)
SODIUM SERPL-SCNC: 140 MMOL/L (ref 135–145)
TSH SERPL DL<=0.005 MIU/L-ACNC: 1.76 UIU/ML (ref 0.3–4.2)
WBC # BLD AUTO: 6.8 10E3/UL (ref 4–11)

## 2024-07-19 PROCEDURE — 36415 COLL VENOUS BLD VENIPUNCTURE: CPT | Performed by: NURSE PRACTITIONER

## 2024-07-19 PROCEDURE — 83735 ASSAY OF MAGNESIUM: CPT | Performed by: NURSE PRACTITIONER

## 2024-07-19 PROCEDURE — 80048 BASIC METABOLIC PNL TOTAL CA: CPT | Performed by: NURSE PRACTITIONER

## 2024-07-19 PROCEDURE — 86140 C-REACTIVE PROTEIN: CPT | Performed by: NURSE PRACTITIONER

## 2024-07-19 PROCEDURE — 85027 COMPLETE CBC AUTOMATED: CPT | Performed by: NURSE PRACTITIONER

## 2024-07-19 PROCEDURE — 84443 ASSAY THYROID STIM HORMONE: CPT | Performed by: NURSE PRACTITIONER

## 2024-07-19 PROCEDURE — 99214 OFFICE O/P EST MOD 30 MIN: CPT | Performed by: NURSE PRACTITIONER

## 2024-07-19 PROCEDURE — 73590 X-RAY EXAM OF LOWER LEG: CPT | Mod: TC | Performed by: RADIOLOGY

## 2024-07-19 NOTE — PROGRESS NOTES
"  Assessment & Plan     Leg cramp  Unclear cause of leg cramping. Normal lab work. Follow up if worsening or not improving.    - Basic metabolic panel; Future  - CBC with platelets; Future  - Magnesium; Future  - TSH with free T4 reflex; Future  - CRP inflammation; Future  - Basic metabolic panel  - CBC with platelets  - Magnesium  - TSH with free T4 reflex  - CRP inflammation    Mass of shin  Soft tissue calcification seen on xray. Referred to gen surgery to discuss further.   - XR Tibia and Fibula Right 2 Views; Future    Other migraine without status migrainosus, not intractable  No cause of migraines. Normal testing.  Patient reports headaches have improved.    If symptoms worsen should be seen again.    Can take tylenol or ibuprofen as needed.     - Basic metabolic panel; Future  - CBC with platelets; Future  - TSH with free T4 reflex; Future  - Basic metabolic panel  - CBC with platelets  - TSH with free T4 reflex    Soft tissue calcification  Bump is causing pain and irritating patient. Will put in gen surgery referral.     - Adult Gen Surg  Referral; Future          BMI  Estimated body mass index is 32.78 kg/m  as calculated from the following:    Height as of this encounter: 1.689 m (5' 6.5\").    Weight as of this encounter: 93.5 kg (206 lb 3.2 oz).             Subjective   Immaculee is a 48 year old, presenting for the following health issues:  Musculoskeletal Problem (Right ankle has a bump and painful. Headache 3 days and not going away. )    History of Present Illness       Headaches:   Since the patient's last clinic visit, headaches are: worsened  The patient is getting headaches:  2/ week  She is not able to do normal daily activities when she has a migraine.  The patient is taking the following rescue/relief medications:  Ibuprofen (Advil, Motrin)   Patient states \"I get some relief\" from the rescue/relief medications.   The patient is taking the following medications to prevent migraines:  " "No medications to prevent migraines  In the past 4 weeks, the patient has gone to an Urgent Care or Emergency Room 0 times times due to headaches.    Reason for visit:  A painful bump on my low right leg and headache  Symptom onset:  3-7 days ago  Symptoms include:  Pain  Symptom intensity:  Moderate  Symptom progression:  Worsening  Had these symptoms before:  No  What makes it worse:  Touching  What makes it better:  Rest    She eats 2-3 servings of fruits and vegetables daily.She consumes 0 sweetened beverage(s) daily.She exercises with enough effort to increase her heart rate 10 to 19 minutes per day.  She exercises with enough effort to increase her heart rate 3 or less days per week.   She is taking medications regularly.     Bump on front of shin. Painful/tender to touch. No No known injury. Patient reports has more sedentary job.  Cramps in the morning in leg when waking up.     Headaches starting weds morning. Was warm and took tylenol and ibuprofen woke up again yesterday with headache. Just right side of head. Didn't take anything today. Has previously had migraines in the past. Can't see as well as normal. Needs to schedule with eye doctor.                     Objective    /64 (BP Location: Right arm, Patient Position: Sitting, Cuff Size: Adult Regular)   Pulse 61   Temp 98.5  F (36.9  C) (Oral)   Resp 18   Ht 1.689 m (5' 6.5\")   Wt 93.5 kg (206 lb 3.2 oz)   LMP 07/07/2024 (Approximate)   SpO2 100%   BMI 32.78 kg/m    Body mass index is 32.78 kg/m .  Physical Exam  Constitutional:       Appearance: Normal appearance.   Musculoskeletal:        Legs:       Comments: Negative brenden testing   Neurological:      General: No focal deficit present.      Mental Status: She is alert and oriented to person, place, and time.   Psychiatric:         Mood and Affect: Mood normal.         Behavior: Behavior normal.            Results for orders placed or performed in visit on 07/19/24   XR Tibia and " Fibula Right 2 Views     Status: None    Narrative    EXAM: XR TIBIA AND FIBULA RIGHT 2 VIEWS  LOCATION: [facility]  DATE: 7/19/2024    INDICATION:  Mass of shin  COMPARISON: None.      Impression    IMPRESSION: No acute fracture. Small soft tissue calcifications over the anterior distal leg.   Results for orders placed or performed in visit on 07/19/24   Basic metabolic panel     Status: Abnormal   Result Value Ref Range    Sodium 140 135 - 145 mmol/L    Potassium 3.9 3.4 - 5.3 mmol/L    Chloride 108 (H) 98 - 107 mmol/L    Carbon Dioxide (CO2) 22 22 - 29 mmol/L    Anion Gap 10 7 - 15 mmol/L    Urea Nitrogen 5.7 (L) 6.0 - 20.0 mg/dL    Creatinine 0.68 0.51 - 0.95 mg/dL    GFR Estimate >90 >60 mL/min/1.73m2    Calcium 9.5 8.8 - 10.4 mg/dL    Glucose 100 (H) 70 - 99 mg/dL   CBC with platelets     Status: Normal   Result Value Ref Range    WBC Count 6.8 4.0 - 11.0 10e3/uL    RBC Count 4.83 3.80 - 5.20 10e6/uL    Hemoglobin 14.7 11.7 - 15.7 g/dL    Hematocrit 44.3 35.0 - 47.0 %    MCV 92 78 - 100 fL    MCH 30.4 26.5 - 33.0 pg    MCHC 33.2 31.5 - 36.5 g/dL    RDW 13.0 10.0 - 15.0 %    Platelet Count 428 150 - 450 10e3/uL   Magnesium     Status: Normal   Result Value Ref Range    Magnesium 2.3 1.7 - 2.3 mg/dL   TSH with free T4 reflex     Status: Normal   Result Value Ref Range    TSH 1.76 0.30 - 4.20 uIU/mL   CRP inflammation     Status: Normal   Result Value Ref Range    CRP Inflammation <3.00 <5.00 mg/L           Signed Electronically by: AMELIA HAWKINS CNP

## 2024-07-21 ENCOUNTER — HEALTH MAINTENANCE LETTER (OUTPATIENT)
Age: 49
End: 2024-07-21

## 2024-07-30 ENCOUNTER — OFFICE VISIT (OUTPATIENT)
Dept: SURGERY | Facility: CLINIC | Age: 49
End: 2024-07-30
Attending: NURSE PRACTITIONER
Payer: COMMERCIAL

## 2024-07-30 VITALS
DIASTOLIC BLOOD PRESSURE: 66 MMHG | HEIGHT: 67 IN | SYSTOLIC BLOOD PRESSURE: 112 MMHG | WEIGHT: 209.3 LBS | BODY MASS INDEX: 32.85 KG/M2

## 2024-07-30 DIAGNOSIS — M79.89 SOFT TISSUE CALCIFICATION: ICD-10-CM

## 2024-07-30 PROCEDURE — 99242 OFF/OP CONSLTJ NEW/EST SF 20: CPT | Performed by: SURGERY

## 2024-07-30 NOTE — PROGRESS NOTES
HPI: Lindsey Gavin is a 48 year old female referred to see me by Darcy Yoon for an area of pain and calcification overlying the right lower leg.  She notes that she hit this area very hard last winter, and had a large bruise following that injury.  That resolved slowly over time.  Roughly 2 weeks ago, she reported having a painful lump over her right lower leg with an associated bruise.  She notes the pain ceased about a week ago and the firm nodule in the area has persisted.  Was seen by her primary care provider last week who obtained plain films that demonstrated some soft tissue calcifications but no other obvious pathology.      Allergies:Patient has no known allergies.    Prior Medical History:   Past Medical History:   Diagnosis Date    Tinnitus, bilateral 10/9/2018       Prior Surgical History:   Past Surgical History:   Procedure Laterality Date     SECTION       SECTION       SECTION         CURRENT MEDS:  Prior to Admission medications    Medication Sig Start Date End Date Taking? Authorizing Provider   Multiple Vitamin (MULTIVITAMIN ADULT PO) Multi Vitamin  Patient not taking: Reported on 2024    Reported, Patient         Family History   Problem Relation Age of Onset    Cerebrovascular Disease Mother     Hypertension Mother     Other - See Comments Father         influenza like illness    No Known Problems Sister     No Known Problems Brother     No Known Problems Sister     No Known Problems Sister     No Known Problems Sister     No Known Problems Sister     No Known Problems Sister     No Known Problems Brother     Breast Cancer No family hx of     Skin Cancer No family hx of     Colon Cancer No family hx of         reports that she has never smoked. She has never been exposed to tobacco smoke. She has never used smokeless tobacco. She reports that she does not drink alcohol and does not use drugs.    History   Smoking Status    Never  "  Smokeless Tobacco    Never       Review of Systems -    The 10 point review of systems is within normal limits except as noted in HPI.    /66   Ht 1.702 m (5' 7\")   Wt 94.9 kg (209 lb 4.8 oz)   LMP 07/07/2024 (Approximate)   BMI 32.78 kg/m    209 lbs 4.8 oz  Body mass index is 32.78 kg/m .    EXAM:  GENERAL: Alert, cooperative, appears stated age  SKIN: Firm, mobile 1 cm mass in the subcutaneous fatty tissues overlying the tibia of the lower right leg.  Currently nontender.  No overlying scars.  No overlying skin changes.    LABS:  Lab Results   Component Value Date    HGB 14.7 07/19/2024    WBC 6.8 07/19/2024     07/19/2024    AST 28 11/10/2020    ALT 56 (H) 11/10/2020    ALKPHOS 52 11/10/2020    BILITOTAL 0.3 11/10/2020           Assessment/Plan:   1. Soft tissue calcification          Immaculee FARIDA Gavin is a 48 year old female with signs and symptoms consistent with fat necrosis and scar tissue formation following traumatic injury last year.  I have explained the pathophysiology of this process in detail as well as the surgical versus non-operative management strategies.      As I suspect this is fairly chronic, given the time reported since her injury, I do not think that it is acutely worse or needs any acute surgical intervention.  With the resolution of pain I would recommend doing a whole lot of nothing at this point in time.    15 minutes spent on the date of the encounter doing chart review, patient visit, and documentation    Emmett Boykin MD ,MD  [facility] Department of Surgery  "

## 2024-07-30 NOTE — LETTER
2024      Lindsey Gavin  [id number] [place] [id number]      Dear Colleague,    Thank you for referring your patient, Lindsey Gavin, to the [facility] [place]. Please see a copy of my visit note below.    HPI: Lindsey Gavin is a 48 year old female referred to see me by Darcy Yoon for an area of pain and calcification overlying the right lower leg.  She notes that she hit this area very hard last winter, and had a large bruise following that injury.  That resolved slowly over time.  Roughly 2 weeks ago, she reported having a painful lump over her right lower leg with an associated bruise.  She notes the pain ceased about a week ago and the firm nodule in the area has persisted.  Was seen by her primary care provider last week who obtained plain films that demonstrated some soft tissue calcifications but no other obvious pathology.      Allergies:Patient has no known allergies.    Prior Medical History:   Past Medical History:   Diagnosis Date     Tinnitus, bilateral 10/9/2018       Prior Surgical History:   Past Surgical History:   Procedure Laterality Date      SECTION        SECTION        SECTION         CURRENT MEDS:  Prior to Admission medications    Medication Sig Start Date End Date Taking? Authorizing Provider   Multiple Vitamin (MULTIVITAMIN ADULT PO) Multi Vitamin  Patient not taking: Reported on 2024    Reported, Patient         Family History   Problem Relation Age of Onset     Cerebrovascular Disease Mother      Hypertension Mother      Other - See Comments Father         influenza like illness     No Known Problems Sister      No Known Problems Brother      No Known Problems Sister      No Known Problems Sister      No Known Problems Sister      No Known Problems Sister      No Known Problems Sister      No Known Problems Brother      Breast Cancer No family hx of   "    Skin Cancer No family hx of      Colon Cancer No family hx of         reports that she has never smoked. She has never been exposed to tobacco smoke. She has never used smokeless tobacco. She reports that she does not drink alcohol and does not use drugs.    History   Smoking Status     Never   Smokeless Tobacco     Never       Review of Systems -    The 10 point review of systems is within normal limits except as noted in HPI.    /66   Ht 1.702 m (5' 7\")   Wt 94.9 kg (209 lb 4.8 oz)   LMP 07/07/2024 (Approximate)   BMI 32.78 kg/m    209 lbs 4.8 oz  Body mass index is 32.78 kg/m .    EXAM:  GENERAL: Alert, cooperative, appears stated age  SKIN: Firm, mobile 1 cm mass in the subcutaneous fatty tissues overlying the tibia of the lower right leg.  Currently nontender.  No overlying scars.  No overlying skin changes.    LABS:  Lab Results   Component Value Date    HGB 14.7 07/19/2024    WBC 6.8 07/19/2024     07/19/2024    AST 28 11/10/2020    ALT 56 (H) 11/10/2020    ALKPHOS 52 11/10/2020    BILITOTAL 0.3 11/10/2020           Assessment/Plan:   1. Soft tissue calcification          Immaculee FARIDA Gavin is a 48 year old female with signs and symptoms consistent with fat necrosis and scar tissue formation following traumatic injury last year.  I have explained the pathophysiology of this process in detail as well as the surgical versus non-operative management strategies.      As I suspect this is fairly chronic, given the time reported since her injury, I do not think that it is acutely worse or needs any acute surgical intervention.  With the resolution of pain I would recommend doing a whole lot of nothing at this point in time.    15 minutes spent on the date of the encounter doing chart review, patient visit, and documentation    Emmett Boykin MD ,MD  [facility] Department of Surgery      Again, thank you for allowing me to participate in the care of your patient.  "       Sincerely,        Emmett Boykin MD

## 2024-08-06 ENCOUNTER — OFFICE VISIT (OUTPATIENT)
Dept: FAMILY MEDICINE | Facility: CLINIC | Age: 49
End: 2024-08-06
Payer: COMMERCIAL

## 2024-08-06 VITALS
HEIGHT: 67 IN | DIASTOLIC BLOOD PRESSURE: 72 MMHG | BODY MASS INDEX: 32.79 KG/M2 | TEMPERATURE: 99.2 F | WEIGHT: 208.9 LBS | RESPIRATION RATE: 16 BRPM | SYSTOLIC BLOOD PRESSURE: 109 MMHG | HEART RATE: 63 BPM | OXYGEN SATURATION: 100 %

## 2024-08-06 DIAGNOSIS — M25.512 CHRONIC LEFT SHOULDER PAIN: ICD-10-CM

## 2024-08-06 DIAGNOSIS — M54.2 NECK PAIN: ICD-10-CM

## 2024-08-06 DIAGNOSIS — G89.29 CHRONIC LEFT SHOULDER PAIN: ICD-10-CM

## 2024-08-06 DIAGNOSIS — Z00.00 HEALTH CARE MAINTENANCE: ICD-10-CM

## 2024-08-06 DIAGNOSIS — Z12.11 SCREEN FOR COLON CANCER: Primary | ICD-10-CM

## 2024-08-06 PROCEDURE — 99213 OFFICE O/P EST LOW 20 MIN: CPT | Performed by: FAMILY MEDICINE

## 2024-08-06 NOTE — PROGRESS NOTES
"  Problem List Items Addressed This Visit          Nervous and Auditory    Shoulder pain     Neck and shoulder pain into left arm, ongoing x 10 years. In the past physical therapy helped. She has not continued physical therapy and is noting pain is worsening.     Recommend return to PT.    She wants further testing, but exam is normal, no spine bony tenderness. Recommended spine center for further eval.     In the past she has seen neuro and all imaging was normal. Seen notes from 2022            Other    Health care maintenance     Due for annual exam, follow-up to get as placed.  Colonoscopy is also due and ordered.          Other Visit Diagnoses       Screen for colon cancer    -  Primary    Relevant Orders    Colonoscopy Screening  Referral    Neck pain        Relevant Orders    Spine  Referral             Subjective   Lindsey is a 48 year old, presenting for the following health issues:  neck pain (Whole left side us numb feeling/)      8/6/2024    12:32 PM   Additional Questions   Roomed by as   Accompanied by self         8/6/2024    12:32 PM   Patient Reported Additional Medications   Patient reports taking the following new medications no     History of Present Illness       Headaches:   Since the patient's last clinic visit, headaches are: worsened  The patient is getting headaches:  2-3 times a week  She is not able to do normal daily activities when she has a migraine.  The patient is taking the following rescue/relief medications:  Ibuprofen (Advil, Motrin), Naproxyn (Aleve) and Tylenol   Patient states \"The relief is inconsistent\" from the rescue/relief medications.   The patient is taking the following medications to prevent migraines:  No medications to prevent migraines  In the past 4 weeks, the patient has gone to an Urgent Care or Emergency Room 0 times times due to headaches.    Reason for visit:  Neck nerve pinched and numbness and tingling in left side of my body    She eats " "2-3 servings of fruits and vegetables daily.She consumes 0 sweetened beverage(s) daily.She exercises with enough effort to increase her heart rate 9 or less minutes per day.  She exercises with enough effort to increase her heart rate 3 or less days per week.   She is taking medications regularly.           Objective    /72 (BP Location: Left arm, Patient Position: Left side, Cuff Size: Adult Large)   Pulse 63   Temp 99.2  F (37.3  C) (Oral)   Resp 16   Ht 1.702 m (5' 7\")   Wt 94.8 kg (208 lb 14.4 oz)   LMP 07/07/2024 (Approximate)   SpO2 100%   BMI 32.72 kg/m    Body mass index is 32.72 kg/m .  Physical Exam  Constitutional:       Appearance: Normal appearance.   HENT:      Head: Normocephalic and atraumatic.   Cardiovascular:      Rate and Rhythm: Normal rate and regular rhythm.   Pulmonary:      Effort: Pulmonary effort is normal.   Musculoskeletal:         General: Normal range of motion.      Cervical back: Normal range of motion and neck supple.   Neurological:      General: No focal deficit present.      Mental Status: She is alert and oriented to person, place, and time.                  Signed Electronically by: Darcy Yoon MD    "

## 2024-08-06 NOTE — ASSESSMENT & PLAN NOTE
Neck and shoulder pain into left arm, ongoing x 10 years. In the past physical therapy helped. She has not continued physical therapy and is noting pain is worsening.     Recommend return to PT.    She wants further testing, but exam is normal, no spine bony tenderness. Recommended spine center for further eval.     In the past she has seen neuro and all imaging was normal. Seen notes from 2022

## 2024-09-06 NOTE — PROGRESS NOTES
ASSESSMENT: Lindsey Gavin is a 48 year old female with past medical history significant for shoulder pain, migraine, obesity, anemia who presents today for new patient evaluation of chronic left sided pain involving the neck, arm, trunk, low back, and leg.  Pain began 15 years ago and waxes and wanes in intensity.  Most recent flare occurred last month with no trauma.  Most recent flare has improved the patient continues to have daily pain.  When pain is severe it significantly impacts her function.  She has missed work because of the pain and sometimes has difficulty even preparing a meal for her family because of the pain.  An MRI cervical spine from 2019 shows slight reversal of normal cervical lordosis but was otherwise unremarkable.  On exam patient reported a sensory deficit left lateral upper arm and left lateral calf.        PLAN:  A shared decision making model was used.  The patient's values and choices were respected.  The following represents what was discussed and decided upon by the physician assistant and the patient.      1.  DIAGNOSTIC TESTS:  - I reviewed the MRI cervical spine from 2019.  - Reviewed the x-ray right shoulder from 2022.  - Ordered MRI scans of the cervical, thoracic, and lumbar spine for further evaluation.  - Also ordered EMG left upper and lower extremity.    2.  PHYSICAL THERAPY:  - Patient went to 2 sessions of physical therapy for left shoulder pain September/October 2022.  She would likely benefit from additional physical therapy.  I held off on ordering this until her diagnostic workup is complete.    3.  MEDICATIONS:  - Offered for the patient to trial gabapentin.  She declined.  - Patient can continue Tylenol 1000 mg as needed  - Patient can continue ibuprofen 600 mg as needed.    4.  INTERVENTIONS:  No interventions were ordered.    5.  PATIENT EDUCATION: Patient is in agreement the above plan.  All questions were answered.    6.  FOLLOW-UP:   Patient will  follow-up with me after her MRI scans and EMG.  She should have 40 minutes for the visit.        SUBJECTIVE:  Lindsey Gavin  Is a 48 year old female who presents today in consultation at request of Dr. Yoon for new patient evaluation of neck pain with left arm pain, left thoracic and lumbar pain, and left leg pain.  Patient reports that she first began to have problems about 15 years ago.  She denies any injury or event to cause the pain.  She has had ongoing pain ever since.  She states that the pain waxes and wanes in intensity.  She has episodic flareups of pain.  Most recent flare occurred last month when she was on vacation in [facility].  She denies any injury or event to cause the flare of pain.  Flare is improving gradually with rest and ibuprofen.  She does feel she is getting flareups of pain more frequently.    Patient complains of left-sided neck pain.  Patient reports that she feels an electric shock type sensation in her left neck.  She states when this occurs she cannot even move her neck.  She states it is so severe she screams out in pain.  She then experiences burning pain that radiates into the left shoulder, down the left lateral upper arm into the left extensor forearm.  She also feels the pain radiate down the trunk involving the thoracic and lumbar regions and extends down the buttock, lateral thigh, to the lateral calf, and into the plantar foot.  She has tingling in the same distribution as her burning pain.  She also has intermittent numbness and tingling in right hand.  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  Pain is alleviated with rest.  She denies loss of bowel or bladder control.  Denies recent fevers.    Treatment to date:  - Chiropractic treatment years ago with short-term relief  - Physical therapy x 2 for left shoulder pain in 2022 somewhat helpful  - No spine injections  - No spine  surgeries  - Ibuprofen 600 milligrams as needed somewhat helpful  - Tylenol 1000 g as needed somewhat helpful    Current Outpatient Medications   Medication Sig Dispense Refill    Multiple Vitamin (MULTIVITAMIN ADULT PO)        No current facility-administered medications for this visit.       No Known Allergies    Past Medical History:   Diagnosis Date    Tinnitus, bilateral 10/9/2018        Patient Active Problem List   Diagnosis    Abnormal CT scan, chest    Uterine leiomyoma    Encounter for counseling regarding contraception    [facility] care maintenance    Shoulder pain    Class 1 obesity without serious comorbidity with body mass index (BMI) of 32.0 to 32.9 in adult    Anemia    Migraine    Test anxiety       Past Surgical History:   Procedure Laterality Date     SECTION  2005     SECTION       SECTION         Family History   Problem Relation Age of Onset    Cerebrovascular Disease Mother     Hypertension Mother     Other - See Comments Father         influenza like illness    No Known Problems Sister     No Known Problems Brother     No Known Problems Sister     No Known Problems Sister     No Known Problems Sister     No Known Problems Sister     No Known Problems Sister     No Known Problems Brother     Breast Cancer No family hx of     Skin Cancer No family hx of     Colon Cancer No family hx of        Social history: Patient is a registered nurse.  She is .  She drinks alcohol occasionally.  Denies tobacco use.  Denies recreational drug use.    ROS: Positive for weight gain, headache, feet/leg swelling, joint pain, muscle pain.  Specifically negative for dysphagia, imbalance, fine motor skill difficulties, bowel/bladder dysfunction, fevers,chills, appetite changes, unexplained weight loss.   Otherwise 13 systems reviewed are negative.  Please see the patient's intake questionnaire from today for details.      OBJECTIVE:  PHYSICAL  EXAMINATION:  CONSTITUTIONAL:  Vital signs as above.  No acute distress.  The patient is well nourished and well groomed.  PSYCHIATRIC:  The patient is awake, alert, oriented to person, place, time and answering questions appropriately with clear speech.    HEENT:  Normocephalic, atraumatic.  Sclera clear.    SKIN:  Skin over the face, bilateral upper extremities, and neck is clean, dry, intact without rashes.   MUSCLE STRENGTH:  5/5 strength for the bilateral shoulder abductors, elbow flexors/extensors, wrist extensors, finger flexors/abductors.  NEURO:  CN III-XII are grossly intact.  2+ symmetric biceps, brachioradialis, triceps reflexes bilaterally.  1+ patellar and 2+ Achilles reflexes bilaterally.  Sensation to light touch is subjectively diminished left lateral upper arm and left lateral calf.  Negative Diaz's bilaterally.  No ankle clonus.  Negative Linsky's bilaterally.  VASCULAR:  2/4 radial pulses bilaterally.  Warm upper limbs bilaterally.  Capillary refill in the upper extremities is less than 1 second.  MUSCULOSKELETAL: Tender to palpation left cervical paraspinous muscles.  Ambulates with a narrow-base, nonantalgic gait.  Able to rise onto toes and heels bilaterally.    RESULTS:    I reviewed the MRI cervical spine from [facility] dated April 24, 2019.  This shows straightening and slight reversal of normal cervical lordosis but otherwise is normal.    I reviewed the x-ray right shoulder dated December 28, 2022 which is normal.

## 2024-09-09 PROBLEM — D64.9 ANEMIA: Status: RESOLVED | Noted: 2022-09-16 | Resolved: 2024-09-09

## 2024-09-09 PROBLEM — Z30.09 ENCOUNTER FOR COUNSELING REGARDING CONTRACEPTION: Status: RESOLVED | Noted: 2018-10-09 | Resolved: 2024-09-09

## 2024-09-10 ENCOUNTER — OFFICE VISIT (OUTPATIENT)
Dept: PHYSICAL MEDICINE AND REHAB | Facility: CLINIC | Age: 49
End: 2024-09-10
Attending: FAMILY MEDICINE
Payer: COMMERCIAL

## 2024-09-10 VITALS
HEIGHT: 67 IN | BODY MASS INDEX: 32.77 KG/M2 | WEIGHT: 208.8 LBS | HEART RATE: 60 BPM | DIASTOLIC BLOOD PRESSURE: 79 MMHG | SYSTOLIC BLOOD PRESSURE: 121 MMHG

## 2024-09-10 DIAGNOSIS — M54.6 PAIN IN THORACIC SPINE: ICD-10-CM

## 2024-09-10 DIAGNOSIS — R20.2 PARESTHESIA: ICD-10-CM

## 2024-09-10 DIAGNOSIS — M54.12 CERVICAL RADICULAR PAIN: Primary | ICD-10-CM

## 2024-09-10 DIAGNOSIS — M54.16 LUMBAR RADICULAR PAIN: ICD-10-CM

## 2024-09-10 PROCEDURE — 99204 OFFICE O/P NEW MOD 45 MIN: CPT | Performed by: PHYSICIAN ASSISTANT

## 2024-09-10 ASSESSMENT — PAIN SCALES - GENERAL: PAINLEVEL: MODERATE PAIN (4)

## 2024-09-10 NOTE — LETTER
9/10/2024      Lindsey Gavin  [age]  [address] [place] [id number]      Dear Colleague,    Thank you for referring your patient, Lindsey Gavin, to the [facility]. Please see a copy of my visit note below.    ASSESSMENT: Lindsey Gavin is a 48 year old female with past medical history significant for shoulder pain, migraine, obesity, anemia who presents today for new patient evaluation of chronic left sided pain involving the neck, arm, trunk, low back, and leg.  Pain began 15 years ago and waxes and wanes in intensity.  Most recent flare occurred last month with no trauma.  Most recent flare has improved the patient continues to have daily pain.  When pain is severe it significantly impacts her function.  She has missed work because of the pain and sometimes has difficulty even preparing a meal for her family because of the pain.  An MRI cervical spine from 2019 shows slight reversal of normal cervical lordosis but was otherwise unremarkable.  On exam patient reported a sensory deficit left lateral upper arm and left lateral calf.        PLAN:  A shared decision making model was used.  The patient's values and choices were respected.  The following represents what was discussed and decided upon by the physician assistant and the patient.      1.  DIAGNOSTIC TESTS:  - I reviewed the MRI cervical spine from 2019.  - Reviewed the x-ray right shoulder from 2022.  - Ordered MRI scans of the cervical, thoracic, and lumbar spine for further evaluation.  - Also ordered EMG left upper and lower extremity.    2.  PHYSICAL THERAPY:  - Patient went to 2 sessions of physical therapy for left shoulder pain September/October 2022.  She would likely benefit from additional physical therapy.  I held off on ordering this until her diagnostic workup is complete.    3.  MEDICATIONS:  - Offered for the patient to trial gabapentin.  She declined.  - Patient can continue  Tylenol 1000 mg as needed  - Patient can continue ibuprofen 600 mg as needed.    4.  INTERVENTIONS:  No interventions were ordered.    5.  PATIENT EDUCATION: Patient is in agreement the above plan.  All questions were answered.    6.  FOLLOW-UP:   Patient will follow-up with me after her MRI scans and EMG.  She should have 40 minutes for the visit.        SUBJECTIVE:  Lindsey Gavin  Is a 48 year old female who presents today in consultation at request of Dr. Yoon for new patient evaluation of neck pain with left arm pain, left thoracic and lumbar pain, and left leg pain.  Patient reports that she first began to have problems about 15 years ago.  She denies any injury or event to cause the pain.  She has had ongoing pain ever since.  She states that the pain waxes and wanes in intensity.  She has episodic flareups of pain.  Most recent flare occurred last month when she was on vacation in [facility].  She denies any injury or event to cause the flare of pain.  Flare is improving gradually with rest and ibuprofen.  She does feel she is getting flareups of pain more frequently.    Patient complains of left-sided neck pain.  Patient reports that she feels an electric shock type sensation in her left neck.  She states when this occurs she cannot even move her neck.  She states it is so severe she screams out in pain.  She then experiences burning pain that radiates into the left shoulder, down the left lateral upper arm into the left extensor forearm.  She also feels the pain radiate down the trunk involving the thoracic and lumbar regions and extends down the buttock, lateral thigh, to the lateral calf, and into the plantar foot.  She has tingling in the same distribution as her burning pain.  She also has intermittent numbness and tingling in right hand.  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  Pain is alleviated with rest.  She  denies loss of bowel or bladder control.  Denies recent fevers.    Treatment to date:  - Chiropractic treatment years ago with short-term relief  - Physical therapy x 2 for left shoulder pain in  somewhat helpful  - No spine injections  - No spine surgeries  - Ibuprofen 600 milligrams as needed somewhat helpful  - Tylenol 1000 g as needed somewhat helpful    Current Outpatient Medications   Medication Sig Dispense Refill     Multiple Vitamin (MULTIVITAMIN ADULT PO)        No current facility-administered medications for this visit.       No Known Allergies    Past Medical History:   Diagnosis Date     Tinnitus, bilateral 10/9/2018        Patient Active Problem List   Diagnosis     Abnormal CT scan, chest     Uterine leiomyoma     Encounter for counseling regarding contraception     [facility] maintenance     Shoulder pain     Class 1 obesity without serious comorbidity with body mass index (BMI) of 32.0 to 32.9 in adult     Anemia     Migraine     Test anxiety       Past Surgical History:   Procedure Laterality Date      SECTION        SECTION        SECTION         Family History   Problem Relation Age of Onset     Cerebrovascular Disease Mother      Hypertension Mother      Other - See Comments Father         influenza like illness     No Known Problems Sister      No Known Problems Brother      No Known Problems Sister      No Known Problems Sister      No Known Problems Sister      No Known Problems Sister      No Known Problems Sister      No Known Problems Brother      Breast Cancer No family hx of      Skin Cancer No family hx of      Colon Cancer No family hx of        Social history: Patient is a registered nurse.  She is .  She drinks alcohol occasionally.  Denies tobacco use.  Denies recreational drug use.    ROS: Positive for weight gain, headache, feet/leg swelling, joint pain, muscle pain.  Specifically negative for dysphagia, imbalance, fine  motor skill difficulties, bowel/bladder dysfunction, fevers,chills, appetite changes, unexplained weight loss.   Otherwise 13 systems reviewed are negative.  Please see the patient's intake questionnaire from today for details.      OBJECTIVE:  PHYSICAL EXAMINATION:  CONSTITUTIONAL:  Vital signs as above.  No acute distress.  The patient is well nourished and well groomed.  PSYCHIATRIC:  The patient is awake, alert, oriented to person, place, time and answering questions appropriately with clear speech.    HEENT:  Normocephalic, atraumatic.  Sclera clear.    SKIN:  Skin over the face, bilateral upper extremities, and neck is clean, dry, intact without rashes.   MUSCLE STRENGTH:  5/5 strength for the bilateral shoulder abductors, elbow flexors/extensors, wrist extensors, finger flexors/abductors.  NEURO:  CN III-XII are grossly intact.  2+ symmetric biceps, brachioradialis, triceps reflexes bilaterally.  1+ patellar and 2+ Achilles reflexes bilaterally.  Sensation to light touch is subjectively diminished left lateral upper arm and left lateral calf.  Negative Diaz's bilaterally.  No ankle clonus.  Negative Linsky's bilaterally.  VASCULAR:  2/4 radial pulses bilaterally.  Warm upper limbs bilaterally.  Capillary refill in the upper extremities is less than 1 second.  MUSCULOSKELETAL: Tender to palpation left cervical paraspinous muscles.  Ambulates with a narrow-base, nonantalgic gait.  Able to rise onto toes and heels bilaterally.    RESULTS:    I reviewed the MRI cervical spine from [facility] dated April 24, 2019.  This shows straightening and slight reversal of normal cervical lordosis but otherwise is normal.    I reviewed the x-ray right shoulder dated December 28, 2022 which is normal.      Again, thank you for allowing me to participate in the care of your patient.        Sincerely,        Skylar Nicole PA-C

## 2024-09-10 NOTE — PATIENT INSTRUCTIONS
[facility] Scheduling    Please call [phone] to schedule your image(s) (select option#1).

## 2024-09-27 ENCOUNTER — TELEPHONE (OUTPATIENT)
Dept: PHYSICAL MEDICINE AND REHAB | Facility: CLINIC | Age: 49
End: 2024-09-27

## 2024-09-27 ENCOUNTER — OFFICE VISIT (OUTPATIENT)
Dept: PHYSICAL MEDICINE AND REHAB | Facility: CLINIC | Age: 49
End: 2024-09-27
Attending: PHYSICIAN ASSISTANT
Payer: COMMERCIAL

## 2024-09-27 DIAGNOSIS — R20.2 PARESTHESIA: ICD-10-CM

## 2024-09-27 PROCEDURE — 95886 MUSC TEST DONE W/N TEST COMP: CPT | Performed by: PHYSICAL MEDICINE & REHABILITATION

## 2024-09-27 PROCEDURE — 95912 NRV CNDJ TEST 11-12 STUDIES: CPT | Performed by: PHYSICAL MEDICINE & REHABILITATION

## 2024-09-27 NOTE — LETTER
9/27/2024      Lindsey Gavin  [age]  [address] [place] [id number]      Dear Colleague,    Thank you for referring your patient, Lindsey Gavin, to the [facility] NEUROSURGERY. Please see a copy of my visit note below.    [facility]  [address] [place] [address] [address]  [place] [address]  Office: [phone] Fax: [phone]    Electromyography and Nerve Conduction Study Report        Indication: Patient presents at the request of Skylar Nicole for a left upper and left lower extremity EMG.  She is cervical spine pain with left arm pain and paresthesias all digits of the left hand.  She also has low back pain with left lower extremity paresthesias diffusely into the foot.  Has minimal symptoms on the right.  On exam, normal sensation to light touch throughout the upper and lower extremities bilaterally, 2+ bicep, tricep, brachioradialis reflexes with negative Janell's, 2+ patellar and Achilles reflexes with downgoing toes, and normal muscle strength throughout the major muscle groups of the bilateral upper and lower extremities.      Pt Exam Discussion (Communication Barriers):  Electromyography and nerve conduction testing, including associated discomfort, risks, benefits, and alternatives was discussed with the patient prior to the procedure.  No learning/ communication barriers; patient verbalized understanding of procedure.  Informed consent was obtained.           Pt Assessment:  Testing was successfully completed; patient tolerated testing well.       Blood Thinners: None Skin Temperature: Warmed 31.3                     EMG/NCS  results:     Nerve Conduction Studies  Motor Sites      Segment Distal Latency Neg. Amp CV F-Latency F-Estimate Comment   Site  (ms) (mV) (m/s) (ms) (ms)    Left Fibular (EDB) Motor   Ankle Ankle-EDB 3.3 6.9       Knee Knee-Ankle 11.5 6.7 52      Left Median (APB) Motor   Wrist Wrist-APB 4.3 10.8       Elbow  Elbow-Wrist 8.4 9.5 57      Left Tibial (AH) Motor   Ankle Ankle-AH 3.9 10.9       Knee Knee-Ankle 12.2 9.3 54      Left Ulnar (ADM) Motor   Wrist  3.2 9.7       Bel Elbow Bel Elbow-Wrist 7.4 9.3 57      Ab Elbow Ab Elbow-Wrist 9.3 9.5 57        Sensory Sites      Onset Lat Peak Lat Amp CV Comment   Site (ms) (ms) ( V) (m/s)    Left Median Anti Sensory   Wrist-Dig II 2.5 3.1 70 52    Left Median-Ulnar Palmar Sensory        Median   Palm-Wrist 1.20 1.70 70 67         Ulnar   Palm-Wrist 1.23 1.83 16 65    Left Sural Sensory   B-Ankle 2.7 3.2 *6 -    Left Ulnar Anti Sensory   Wrist-Dig V 2.3 3.0 28 48      H-Reflex Sites      M-Lat H Lat H Neg Amp   Site (ms) (ms) (mV)   Left Tibial (Soleus) H-Reflex   Pop Fossa 5.4 31.4 0.74   Right Tibial (Soleus) H-Reflex   Pop Fossa 5.8 32.2 0.90     H-Reflex Sites      Lt. H Lat Rt. H Lat L-R H Lat L-R H Neg Amp   Site (ms) (ms) (ms) Norm (mV)   Tibial (Soleus) H-Reflex   Pop Fossa 31.4 32.2 0.80  < 3.0 0.16       NCS Waveforms:    Motor                Sensory                H-Reflex           Electromyography     Side Muscle Nerve Root Ins Act Fibs Psw Fasc Recrt Dur Amp Poly Comment   Left AntTibialis Dp Br Fibular L4-5 Nml Nml Nml Nml Nml Nml Nml 0    Left Gastroc Tibial S1-2 Nml Nml Nml Nml Nml Nml Nml 0    Left Fibularis Long Sup Br Fibular L5-S1 Nml Nml Nml Nml Nml Nml Nml 0    Left VastusLat Femoral L2-4 Nml Nml Nml Nml Nml Nml Nml 0    Left RectFemoris Femoral L2-4 Nml Nml Nml Nml Nml Nml Nml 0    Left Deltoid Axillary C5-6 Nml Nml Nml Nml Nml Nml Nml 0    Left Triceps Radial C6-7-8 Nml Nml Nml Nml Nml Nml Nml 0    Left PronatorTeres Median C6-7 Nml Nml Nml Nml Nml Nml Nml 0    Left 1stDorInt Ulnar C8-T1 Nml Nml Nml Nml Nml Nml Nml 0    Left Abd Poll Brev Median C8-T1 Nml Nml Nml Nml Nml Nml Nml 0          Comment NCS: Normal study  1.  Normal nerve conduction studies left upper extremity.  This includes normal left median and ulnar SNAPs, median and ulnar transcarpal  studies, and median and ulnar CMAP's.  2.  Normal nerve conduction studies left lower extremity.  This includes normal left sural SNAP, peroneal and tibial CMAP's, and symmetric tibial H reflexes.    Comment EMG: Normal study  1.  Normal needle EMG left upper and lower extremity.    Interpretation: Normal study    1. There is no electrodiagnostic evidence of cervical radiculopathy, brachial plexopathy, or focal neuropathy in the left upper extremity.      2.  There is no electrodiagnostic evidence of lumbosacral radiculopathy, lumbosacral plexopathy, or focal neuropathy left lower extremity.        The testing was completed in its entirety by the physician.       It was our pleasure caring for your patient today, if there any questions or concerns please do not hesitate to contact us.    Again, thank you for allowing me to participate in the care of your patient.        Sincerely,        Dario Ch, DO

## 2024-09-27 NOTE — PROGRESS NOTES
[facility]  [address] [place] [address] [address]  [place] [address]  Office: [phone] Fax: [phone]    Electromyography and Nerve Conduction Study Report        Indication: Patient presents at the request of Skylar Nicole for a left upper and left lower extremity EMG.  She is cervical spine pain with left arm pain and paresthesias all digits of the left hand.  She also has low back pain with left lower extremity paresthesias diffusely into the foot.  Has minimal symptoms on the right.  On exam, normal sensation to light touch throughout the upper and lower extremities bilaterally, 2+ bicep, tricep, brachioradialis reflexes with negative Janell's, 2+ patellar and Achilles reflexes with downgoing toes, and normal muscle strength throughout the major muscle groups of the bilateral upper and lower extremities.      Pt Exam Discussion (Communication Barriers):  Electromyography and nerve conduction testing, including associated discomfort, risks, benefits, and alternatives was discussed with the patient prior to the procedure.  No learning/ communication barriers; patient verbalized understanding of procedure.  Informed consent was obtained.           Pt Assessment:  Testing was successfully completed; patient tolerated testing well.       Blood Thinners: None Skin Temperature: Warmed 31.3                     EMG/NCS  results:     Nerve Conduction Studies  Motor Sites      Segment Distal Latency Neg. Amp CV F-Latency F-Estimate Comment   Site  (ms) (mV) (m/s) (ms) (ms)    Left Fibular (EDB) Motor   Ankle Ankle-EDB 3.3 6.9       Knee Knee-Ankle 11.5 6.7 52      Left Median (APB) Motor   Wrist Wrist-APB 4.3 10.8       Elbow Elbow-Wrist 8.4 9.5 57      Left Tibial (AH) Motor   Ankle Ankle-AH 3.9 10.9       Knee Knee-Ankle 12.2 9.3 54      Left Ulnar (ADM) Motor   Wrist  3.2 9.7       Bel Elbow Bel Elbow-Wrist 7.4 9.3 57      Ab Elbow Ab Elbow-Wrist 9.3 9.5 57        Sensory Sites      Onset  Lat Peak Lat Amp CV Comment   Site (ms) (ms) ( V) (m/s)    Left Median Anti Sensory   Wrist-Dig II 2.5 3.1 70 52    Left Median-Ulnar Palmar Sensory        Median   Palm-Wrist 1.20 1.70 70 67         Ulnar   Palm-Wrist 1.23 1.83 16 65    Left Sural Sensory   B-Ankle 2.7 3.2 *6 -    Left Ulnar Anti Sensory   Wrist-Dig V 2.3 3.0 28 48      H-Reflex Sites      M-Lat H Lat H Neg Amp   Site (ms) (ms) (mV)   Left Tibial (Soleus) H-Reflex   Pop Fossa 5.4 31.4 0.74   Right Tibial (Soleus) H-Reflex   Pop Fossa 5.8 32.2 0.90     H-Reflex Sites      Lt. H Lat Rt. H Lat L-R H Lat L-R H Neg Amp   Site (ms) (ms) (ms) Norm (mV)   Tibial (Soleus) H-Reflex   Pop Fossa 31.4 32.2 0.80  < 3.0 0.16       NCS Waveforms:    Motor                Sensory                H-Reflex           Electromyography     Side Muscle Nerve Root Ins Act Fibs Psw Fasc Recrt Dur Amp Poly Comment   Left AntTibialis Dp Br Fibular L4-5 Nml Nml Nml Nml Nml Nml Nml 0    Left Gastroc Tibial S1-2 Nml Nml Nml Nml Nml Nml Nml 0    Left Fibularis Long Sup Br Fibular L5-S1 Nml Nml Nml Nml Nml Nml Nml 0    Left VastusLat Femoral L2-4 Nml Nml Nml Nml Nml Nml Nml 0    Left RectFemoris Femoral L2-4 Nml Nml Nml Nml Nml Nml Nml 0    Left Deltoid Axillary C5-6 Nml Nml Nml Nml Nml Nml Nml 0    Left Triceps Radial C6-7-8 Nml Nml Nml Nml Nml Nml Nml 0    Left PronatorTeres Median C6-7 Nml Nml Nml Nml Nml Nml Nml 0    Left 1stDorInt Ulnar C8-T1 Nml Nml Nml Nml Nml Nml Nml 0    Left Abd Poll Brev Median C8-T1 Nml Nml Nml Nml Nml Nml Nml 0          Comment NCS: Normal study  1.  Normal nerve conduction studies left upper extremity.  This includes normal left median and ulnar SNAPs, median and ulnar transcarpal studies, and median and ulnar CMAP's.  2.  Normal nerve conduction studies left lower extremity.  This includes normal left sural SNAP, peroneal and tibial CMAP's, and symmetric tibial H reflexes.    Comment EMG: Normal study  1.  Normal needle EMG left upper and lower  extremity.    Interpretation: Normal study    1. There is no electrodiagnostic evidence of cervical radiculopathy, brachial plexopathy, or focal neuropathy in the left upper extremity.      2.  There is no electrodiagnostic evidence of lumbosacral radiculopathy, lumbosacral plexopathy, or focal neuropathy left lower extremity.        The testing was completed in its entirety by the physician.       It was our pleasure caring for your patient today, if there any questions or concerns please do not hesitate to contact us.

## 2024-09-27 NOTE — TELEPHONE ENCOUNTER
----- Message from Skylar Bonnie sent at 9/27/2024 12:43 PM CDT -----  Regarding: EMG results  Please call this patient and let them know that their EMG was normal.  No evidence of nerve injury which is great news.  They are supposed to get MRI scans and follow-up with me but I do not see that the MRIs are scheduled.  Can you please assist with scheduling and a follow-up visit with me afterwards?  She will need 40 minutes for that follow-up visit.

## 2024-09-27 NOTE — PATIENT INSTRUCTIONS
Thank you for choosing the [facility] for your EMG testing.    The ordering provider will receive your final EMG results within the next few days.  Please follow up with your provider for the results and further treatment recommendations.

## 2024-11-20 ENCOUNTER — PATIENT OUTREACH (OUTPATIENT)
Dept: CARE COORDINATION | Facility: CLINIC | Age: 49
End: 2024-11-20
Payer: COMMERCIAL

## 2024-12-09 ENCOUNTER — LAB REQUISITION (OUTPATIENT)
Dept: LAB | Facility: CLINIC | Age: 49
End: 2024-12-09

## 2024-12-09 DIAGNOSIS — Z12.4 ENCOUNTER FOR SCREENING FOR MALIGNANT NEOPLASM OF CERVIX: ICD-10-CM

## 2024-12-09 DIAGNOSIS — R39.9 UNSPECIFIED SYMPTOMS AND SIGNS INVOLVING THE GENITOURINARY SYSTEM: ICD-10-CM

## 2024-12-09 PROCEDURE — 87624 HPV HI-RISK TYP POOLED RSLT: CPT | Performed by: OBSTETRICS & GYNECOLOGY

## 2024-12-09 PROCEDURE — 87086 URINE CULTURE/COLONY COUNT: CPT | Performed by: OBSTETRICS & GYNECOLOGY

## 2024-12-09 PROCEDURE — G0145 SCR C/V CYTO,THINLAYER,RESCR: HCPCS | Performed by: OBSTETRICS & GYNECOLOGY

## 2024-12-10 ENCOUNTER — PATIENT OUTREACH (OUTPATIENT)
Dept: CARE COORDINATION | Facility: CLINIC | Age: 49
End: 2024-12-10
Payer: COMMERCIAL

## 2024-12-10 LAB
HPV HR 12 DNA CVX QL NAA+PROBE: NEGATIVE
HPV16 DNA CVX QL NAA+PROBE: NEGATIVE
HPV18 DNA CVX QL NAA+PROBE: NEGATIVE
HUMAN PAPILLOMA VIRUS FINAL DIAGNOSIS: NORMAL

## 2024-12-11 LAB — BACTERIA UR CULT: NORMAL

## 2024-12-12 ENCOUNTER — PATIENT OUTREACH (OUTPATIENT)
Dept: CARE COORDINATION | Facility: CLINIC | Age: 49
End: 2024-12-12
Payer: COMMERCIAL

## 2024-12-12 LAB
BKR AP ASSOCIATED HPV REPORT: NORMAL
BKR LAB AP GYN ADEQUACY: NORMAL
BKR LAB AP GYN INTERPRETATION: NORMAL
BKR LAB AP LMP: NORMAL
BKR LAB AP PREVIOUS ABNL DX: NORMAL
BKR LAB AP PREVIOUS ABNORMAL: NORMAL
PATH REPORT.COMMENTS IMP SPEC: NORMAL
PATH REPORT.COMMENTS IMP SPEC: NORMAL
PATH REPORT.RELEVANT HX SPEC: NORMAL

## 2024-12-14 ENCOUNTER — HOSPITAL ENCOUNTER (OUTPATIENT)
Dept: MRI IMAGING | Facility: CLINIC | Age: 49
Discharge: HOME | End: 2024-12-14
Attending: PHYSICIAN ASSISTANT | Admitting: PHYSICIAN ASSISTANT
Payer: COMMERCIAL

## 2024-12-14 DIAGNOSIS — M54.6 PAIN IN THORACIC SPINE: ICD-10-CM

## 2024-12-14 DIAGNOSIS — M54.12 CERVICAL RADICULAR PAIN: ICD-10-CM

## 2024-12-14 DIAGNOSIS — M54.16 LUMBAR RADICULAR PAIN: ICD-10-CM

## 2024-12-14 PROCEDURE — 72146 MRI CHEST SPINE W/O DYE: CPT

## 2024-12-14 PROCEDURE — 72148 MRI LUMBAR SPINE W/O DYE: CPT

## 2024-12-14 PROCEDURE — 72141 MRI NECK SPINE W/O DYE: CPT

## 2024-12-16 ENCOUNTER — TELEPHONE (OUTPATIENT)
Dept: PHYSICAL MEDICINE AND REHAB | Facility: CLINIC | Age: 49
End: 2024-12-16

## 2024-12-16 NOTE — TELEPHONE ENCOUNTER
----- Message from Skylar Bonnie sent at 12/16/2024 12:04 PM CST -----  Please call this patient and let her know that I reviewed her MRI scans.  In the cervical spine she has several small disc bulges and arthritis in the joints but no narrowing around any nerves which is great news.  The MRI of the thoracic spine shows mild arthritis at multiple levels but no narrowing around any nerves.  The MRI of the lumbar spine shows mild to moderate arthritis in the joints but no narrowing around any nerves.  Patient should schedule a follow-up visit with me to review the results in person and discuss treatment options. SHE WILL NEED 40 MINUTES FOR HER FOLLOW UP VISIT.

## 2025-02-12 ENCOUNTER — OFFICE VISIT (OUTPATIENT)
Dept: URGENT CARE | Facility: URGENT CARE | Age: 50
End: 2025-02-12
Payer: COMMERCIAL

## 2025-02-12 VITALS
TEMPERATURE: 98.4 F | BODY MASS INDEX: 32.89 KG/M2 | HEART RATE: 63 BPM | DIASTOLIC BLOOD PRESSURE: 73 MMHG | RESPIRATION RATE: 16 BRPM | WEIGHT: 210 LBS | OXYGEN SATURATION: 98 % | SYSTOLIC BLOOD PRESSURE: 117 MMHG

## 2025-02-12 DIAGNOSIS — R21 RASH: Primary | ICD-10-CM

## 2025-02-12 LAB — DEPRECATED S PYO AG THROAT QL EIA: NEGATIVE

## 2025-02-12 PROCEDURE — 99213 OFFICE O/P EST LOW 20 MIN: CPT | Performed by: PHYSICIAN ASSISTANT

## 2025-02-12 RX ORDER — TRIAMCINOLONE ACETONIDE 1 MG/G
CREAM TOPICAL 2 TIMES DAILY
Qty: 80 G | Refills: 2 | Status: SHIPPED | OUTPATIENT
Start: 2025-02-12

## 2025-02-12 NOTE — PROGRESS NOTES
Assessment & Plan     Rash  Fine papular/ sandpapery rash diffusely, no fevers or ST. No associated uri sx.   Pt denies any contact irritants as she has very sensitive skin and already does excellent job of avoiding irritants and using no products with fragrance or dyes.  RST done to exclude strep throat and is negative.   Recommend trial of zyrtec or claritin bid and topical steroid.  RTC for persistent or worsening sx.     - Streptococcus A Rapid Screen w/Reflex to PCR - Clinic Collect  - triamcinolone (KENALOG) 0.1 % external cream  Dispense: 80 g; Refill: 2           Angelique Sloan PA-C  [facility]    Lisa Martineze is a 49 year old female who presents to clinic today for the following health issues:  Chief Complaint   Patient presents with    Rash     TARTED WITH RASH 2 DAYS AGO ON NECK AREA NOW ALL OVER BODY ITCHES       HPI: pt is a 49 year old female, who presents with concerns re: rash.   Hx of similar rash 2022, never identified cause but was treated with H2 blockers.  Denies topical creams/locations/soaps with fregrances or dyes.   Using : cetaphil for lotion/soap. Free and clear laundry degergent.    No uri sx   No ST.    No allergies.   No fevers. No known exposures. No new foods.      Review of Systems  Constitutional, HEENT, cardiovascular, pulmonary, gi and gu systems are negative, except as otherwise noted.        Patient Active Problem List   Diagnosis    Abnormal CT scan, chest    Uterine leiomyoma    [facility]    Shoulder pain    Class 1 obesity without serious comorbidity with body mass index (BMI) of 32.0 to 32.9 in adult    Migraine    Test anxiety     Current Outpatient Medications   Medication Sig Dispense Refill    triamcinolone (KENALOG) 0.1 % external cream Apply topically 2 times daily. 80 g 2    Multiple Vitamin (MULTIVITAMIN ADULT PO)        No current facility-administered medications for this visit.       Objective     /73   Pulse 63   Temp 98.4  F (36.9  C) (Oral)   Resp 16   Wt 95.3 kg (210 lb)   SpO2 98%   BMI 32.89 kg/m    Physical Exam   Exam of skin reveals fine papules, sandpapery diffuse rash about the chest, back, abdomen, UE, LE.  No palm or sole involvement.   No vesicles, papules or ulcerations.   Results for orders placed or performed in visit on 02/12/25   Streptococcus A Rapid Screen w/Reflex to PCR - Clinic Collect     Status: Normal    Specimen: Throat; Swab   Result Value Ref Range    Group A Strep antigen Negative Negative

## 2025-02-12 NOTE — PATIENT INSTRUCTIONS
Take zyrtec 10 mg 2 x per day or Claritin 10 mg 2 x per day      Topical steroid. Monitor.      Could be viral as well.  Some viral rashes will peal as they get better.   Return for any blistering, open wounds, ulcerations, or fevers.

## 2025-02-13 LAB — S PYO DNA THROAT QL NAA+PROBE: NOT DETECTED

## 2025-05-19 NOTE — PROGRESS NOTES
PHYSICAL THERAPY EVALUATION  Type of Visit: Evaluation       Fall Risk Screen:  Have you fallen 2 or more times in the past year?: No  Have you fallen and had an injury in the past year?: No    Subjective   She has been having chronic left-sided pain in her neck, arm, trunk, low back, and leg for the last 10+ years. She has had MRI scans of her cervical, thoracic, and lumbar spine. The pain gets worse when being more active, especially at the end of the day of work. Lifting also makes the pain worse. The pain is better with gentle exercises and rest. At worst pain is an 8/10 and at best it is a 1-2/10.      Presenting condition or subjective complaint: Neck, back, and legs pain  Date of onset: 27    Relevant medical history:     Dates & types of surgery: :, , and     Prior diagnostic imaging/testing results: MRI     Prior therapy history for the same diagnosis, illness or injury: Yes 10/2022    Living Environment  Social support: With family members   Type of home: [facility]   Stairs to enter the home: Yes 3 Is there a railing: No     Ramp: Yes   Stairs inside the home: Yes 16 Is there a railing: Yes     Help at home: None  Equipment owned:       Employment: Yes Nurse  Hobbies/Interests: Cook, walking    Patient goals for therapy: Free pain       Objective   SPINE EVALUATION  POSTURE: Standing Posture: Rounded shoulders, Forward head, Lordosis decreased, Thoracic kyphosis increased  Sitting Posture: Rounded shoulders, Forward head, Lordosis decreased, Thoracic kyphosis increased  GAIT:   Weightbearing Status: WBAT  Assistive Device(s): None  Gait Deviations: Stride length decreased  Katie decreased  ROM:   (Degrees) Left AROM Right AROM   Cervical Flexion 45   Cervical Extension 45   Cervical Side bend 30 30   Cervical Rotation 30 40   Thoracic Flexion Mod limitation   Thoracic Extension Mod limitation   Thoracic Rotation Mod limitation Mod limitation   Lumbar Side Bending Fingertips to  knee Fingertips to knee   Lumbar Rotation 50% limitation 50% limitation   Lumbar Flexion Fingertips to shins   Lumbar Extension 50% limitation    Left AROM Right AROM    Shoulder Flexion 140 140   Shoulder Adduction 155 155   Shoulder ER WNL WNL     FLEXIBILITY: Limited due to pain   SPECIAL TESTS:   Cervical Special Tests Left Right   Cervical compression - -   Cervical distraction + +   Deep neck flexor endurance test 3 seconds   Upper cervical rotation + +   Sharper-Caesar - -   Alar ligament test - -   Other:       BED MOBILITY: Very guarded, painful, and cautious with her movements when transferring or rolling over.  PALPATION: Pain and tenderness throughout left cervical, thoracic, and lumbar spine. Mild tenderness on right cervical paraspinals. Myofascial hypomobility throughout spine, L>R.  SPINAL SEGMENTAL MOBILITY: Hypomobile and painful throughout.     Assessment & Plan   CLINICAL IMPRESSIONS  Medical Diagnosis: Arthropathy of cervical facet joint,Lumbar arthropathy, Myofascial pain    Treatment Diagnosis: Force production deficit, soft tissue mobility deficit   Impression/Assessment: Patient is a 49 year old female with chronic L>R spinal pain. The following significant findings have been identified: Pain, Decreased ROM/flexibility, Decreased joint mobility, Decreased strength, Impaired gait, Impaired muscle performance, Decreased activity tolerance, and Impaired posture. These impairments interfere with their ability to perform self care tasks, work tasks, recreational activities, household chores, driving , household mobility, and community mobility as compared to previous level of function.     Clinical Decision Making (Complexity):  Clinical Presentation: Evolving/Changing  Clinical Presentation Rationale: based on medical and personal factors listed in PT evaluation  Clinical Decision Making (Complexity): Moderate complexity    PLAN OF CARE  Treatment Interventions:  Modalities: Cryotherapy,  Cupping, Dry Needling, E-stim, Mechanical Traction  Interventions: Gait Training, Manual Therapy, Neuromuscular Re-education, Therapeutic Activity, Therapeutic Exercise, Self-Care/Home Management    Long Term Goals     PT Goal 1  Goal Identifier: HEP  Goal Description: Immshaylee will be independent in their HEP in order to facilitate return to prior level of function.  Goal Progress: In progress  Target Date: 08/20/25  PT Goal 2  Goal Identifier: NDI  Goal Description: Immaculee will demonstrate a decrease in pain and increase in function as measured by scoring </= 19% on the NDI.  Goal Progress: 24%  Target Date: 08/20/25  PT Goal 3  Goal Identifier: CHARLES  Goal Description: Immzachariahulee will demonstrate a decrease in pain and increase in function as measured by scoring </= 10% on the CHARLES.  Goal Progress: 20%  Target Date: 08/20/25  PT Goal 4  Goal Identifier: DNF endurance  Goal Description: Immdarene will demonstrate deep neck flexor endurance of >/=15 seconds without pain in order to improve the ease of their ADLs.  Goal Progress: 3  Target Date: 08/20/25      Frequency of Treatment: 1 x/week  Duration of Treatment: 90 days    Education Assessment:        Risks and benefits of evaluation/treatment have been explained.   Patient/Family/caregiver agrees with Plan of Care.     Evaluation Time:     PT Eval, Moderate Complexity Minutes (13595): 14  Signing Clinician: DARINEL GALLAGHER PT

## 2025-05-22 ENCOUNTER — THERAPY VISIT (OUTPATIENT)
Dept: PHYSICAL THERAPY | Facility: REHABILITATION | Age: 50
End: 2025-05-22
Attending: PHYSICIAN ASSISTANT
Payer: COMMERCIAL

## 2025-05-22 DIAGNOSIS — M47.812 ARTHROPATHY OF CERVICAL FACET JOINT: ICD-10-CM

## 2025-05-22 DIAGNOSIS — M47.816 LUMBAR ARTHROPATHY: ICD-10-CM

## 2025-05-22 DIAGNOSIS — M79.18 MYOFASCIAL PAIN: ICD-10-CM
